# Patient Record
Sex: FEMALE | Race: WHITE | URBAN - METROPOLITAN AREA
[De-identification: names, ages, dates, MRNs, and addresses within clinical notes are randomized per-mention and may not be internally consistent; named-entity substitution may affect disease eponyms.]

---

## 2017-02-16 ENCOUNTER — OFFICE VISIT (OUTPATIENT)
Dept: INTERNAL MEDICINE CLINIC | Age: 37
End: 2017-02-16

## 2017-02-16 VITALS
OXYGEN SATURATION: 100 % | SYSTOLIC BLOOD PRESSURE: 125 MMHG | WEIGHT: 112 LBS | TEMPERATURE: 98.3 F | BODY MASS INDEX: 19.84 KG/M2 | RESPIRATION RATE: 18 BRPM | DIASTOLIC BLOOD PRESSURE: 88 MMHG | HEIGHT: 63 IN | HEART RATE: 90 BPM

## 2017-02-16 DIAGNOSIS — M62.838 MUSCLE SPASM: ICD-10-CM

## 2017-02-16 DIAGNOSIS — F41.9 ANXIETY: Primary | ICD-10-CM

## 2017-02-16 DIAGNOSIS — M26.629 TMJ SYNDROME: ICD-10-CM

## 2017-02-16 DIAGNOSIS — Z76.0 MEDICATION REFILL: ICD-10-CM

## 2017-02-16 DIAGNOSIS — M54.2 NECK PAIN: ICD-10-CM

## 2017-02-16 PROBLEM — S03.00XA TMJ (DISLOCATION OF TEMPOROMANDIBULAR JOINT): Status: ACTIVE | Noted: 2017-02-16

## 2017-02-16 PROBLEM — F32.A DEPRESSION: Status: ACTIVE | Noted: 2017-02-16

## 2017-02-16 RX ORDER — CYCLOBENZAPRINE HCL 10 MG
TABLET ORAL
COMMUNITY
End: 2017-02-16 | Stop reason: SDUPTHER

## 2017-02-16 RX ORDER — BUPROPION HYDROCHLORIDE 100 MG/1
TABLET ORAL
Refills: 0 | COMMUNITY
Start: 2017-02-07 | End: 2017-02-16 | Stop reason: SDUPTHER

## 2017-02-16 RX ORDER — ALPRAZOLAM 0.25 MG/1
0.25 TABLET ORAL
Qty: 30 TAB | Refills: 5 | Status: SHIPPED | OUTPATIENT
Start: 2017-02-16 | End: 2017-08-22 | Stop reason: SDUPTHER

## 2017-02-16 RX ORDER — BUPROPION HYDROCHLORIDE 100 MG/1
100 TABLET ORAL DAILY
Qty: 30 TAB | Refills: 11 | Status: SHIPPED | OUTPATIENT
Start: 2017-02-16 | End: 2017-04-25 | Stop reason: SDUPTHER

## 2017-02-16 RX ORDER — FLUOXETINE HYDROCHLORIDE 20 MG/1
CAPSULE ORAL
Refills: 0 | COMMUNITY
Start: 2017-02-07 | End: 2017-02-16 | Stop reason: SDUPTHER

## 2017-02-16 RX ORDER — ALPRAZOLAM 0.25 MG/1
0.25 TABLET ORAL
COMMUNITY
End: 2017-02-16 | Stop reason: SDUPTHER

## 2017-02-16 RX ORDER — CYCLOBENZAPRINE HCL 10 MG
10 TABLET ORAL
Qty: 30 TAB | Refills: 5 | Status: SHIPPED | OUTPATIENT
Start: 2017-02-16 | End: 2017-10-12 | Stop reason: SDUPTHER

## 2017-02-16 RX ORDER — FLUOXETINE HYDROCHLORIDE 20 MG/1
20 CAPSULE ORAL DAILY
Qty: 30 CAP | Refills: 11 | Status: SHIPPED | OUTPATIENT
Start: 2017-02-16 | End: 2017-10-24

## 2017-02-16 NOTE — MR AVS SNAPSHOT
Visit Information Date & Time Provider Department Dept. Phone Encounter #  
 2/16/2017  2:00 PM Violetta Eaton [facility] [phone] [id number] Follow-up Instructions Return in about 2 months (around 4/16/2017) for annual CPE. Upcoming Health Maintenance Date Due DTaP/Tdap/Td series (1 - Tdap) 11/11/2000 INFLUENZA AGE 9 TO ADULT 8/1/2016 PAP AKA CERVICAL CYTOLOGY 5/13/2017 Allergies as of 2/16/2017  Review Complete On: 2/16/2017 By: Violetta Eaton MD  
 No Known Allergies Current Immunizations  Never Reviewed No immunizations on file. Not reviewed this visit You Were Diagnosed With   
  
 Codes Comments Anxiety    -  Primary ICD-10-CM: F41.9 ICD-9-CM: 300.00 Muscle spasm     ICD-10-CM: E68.667 ICD-9-CM: 728.85 Neck pain     ICD-10-CM: M54.2 ICD-9-CM: 723.1 TMJ syndrome     ICD-10-CM: G43.505 ICD-9-CM: 524.69 Medication refill     ICD-10-CM: Z76.0 ICD-9-CM: V68.1 Vitals BP Pulse Temp Resp Height(growth percentile) Weight(growth percentile) 125/88 90 98.3 °F (36.8 °C) (Oral) 18 5' 3\" (1.6 m) 112 lb (50.8 kg) LMP SpO2 BMI Smoking Status 02/02/2017 100% 19.84 kg/m2 Never Smoker BMI and BSA Data Body Mass Index Body Surface Area  
 19.84 kg/m 2 1.5 m 2 Preferred Pharmacy Pharm[facility] Name [facility] [place] [facility] [phone] Your Updated Medication List  
  
   
This list is accurate as of: 2/16/17  3:06 PM.  Always use your most recent med list.  
  
  
  
  
 ALPRAZolam 0.25 mg tablet Commonly known as:  Aleksandra Castaneda Take 1 Tab by mouth nightly as needed for Anxiety. Max Daily Amount: 0.25 mg. Indications: ANXIETY, PANIC DISORDER  
  
 buPROPion 100 mg tablet Commonly known as:  [facility] Take 1 Tab by mouth daily. Indications: ANXIETY WITH DEPRESSION  
  
 cyclobenzaprine 10 mg tablet Commonly known as:  FLEXERIL Take 1 Tab by mouth three (3) times daily as needed for Muscle Spasm(s). FLUoxetine 20 mg capsule Commonly known as:  PROzac Take 1 Cap by mouth daily. Indications: major depressive disorder Prescriptions Printed Refills ALPRAZolam (XANAX) 0.25 mg tablet 5 Sig: Take 1 Tab by mouth nightly as needed for Anxiety. Max Daily Amount: 0.25 mg. Indications: ANXIETY, PANIC DISORDER Class: Print Route: Oral  
  
Prescriptions Sent to Pharmacy Refills buPROPion (WELLBUTRIN) 100 mg tablet 11 Sig: Take 1 Tab by mouth daily. Indications: ANXIETY WITH DEPRESSION Class: Normal  
 Pharmacy: [place] [id number] Ph #: [phone] Route: Oral  
 FLUoxetine (PROZAC) 20 mg capsule 11 Sig: Take 1 Cap by mouth daily. Indications: major depressive disorder Class: Normal  
 Pharmacy: [place] [address] Ph #: [phone] Route: Oral  
 cyclobenzaprine (FLEXERIL) 10 mg tablet 5 Sig: Take 1 Tab by mouth three (3) times daily as needed for Muscle Spasm(s). Class: Normal  
 Pharmacy: [place] [address] Ph #: [phone] Route: Oral  
  
Follow-up Instructions Return in about 2 months (around 4/16/2017) for annual CPE. Introducing [facility]! Aliyah Gaines introduces [email or web address] patient portal. Now you can access parts of your medical record, email your doctor's office, and request medication refills online. 1. In your internet browser, go to [email or web address] 2. Click on the First Time User? Click Here link in the Sign In box. You will see the New Member Sign Up page. 3. Enter your [email or web address] Access Code exactly as it appears below. You will not need to use this code after youve completed the sign-up process.  If you do not sign up before the expiration date, you must request a new code. · My Digital Life Access Code: [email or web address] Expires: 5/17/2017  3:06 PM 
 
4. Enter the last four digits of your Social Security Number (xxxx) and Date of Birth (mm/dd/yyyy) as indicated and click Submit. You will be taken to the next sign-up page. 5. Create a My Digital Life ID. This will be your My Digital Life login ID and cannot be changed, so think of one that is secure and easy to remember. 6. Create a My Digital Life password. You can change your password at any time. 7. Enter your Password Reset Question and Answer. This can be used at a later time if you forget your password. 8. Enter your e-mail address. You will receive e-mail notification when new information is available in [address] 9. Click Sign Up. You can now view and download portions of your medical record. 10. Click the Download Summary menu link to download a portable copy of your medical information. If you have questions, please visit the Frequently Asked Questions section of the My Digital Life website. Remember, My Digital Life is NOT to be used for urgent needs. For medical emergencies, dial 911. Now available from your iPhone and Android! Please provide this summary of care documentation to your next provider. Your primary care clinician is listed as [facility]. If you have any questions after today's visit, please call [phone].

## 2017-02-16 NOTE — PROGRESS NOTES
HISTORY OF PRESENT ILLNESS  Trena Carvalho is a 39 y.o. female. Visit Vitals    /88    Pulse 90    Temp 98.3 °F (36.8 °C) (Oral)    Resp 18    Ht 5' 3\" (1.6 m)    Wt 112 lb (50.8 kg)    LMP 02/02/2017    SpO2 100%    BMI 19.84 kg/m2       HPI Comments: dx'd with ADD by Dr. Gladis Michaels. 2008? Has been on meds for years. Including Ritalin. She is now off this. Is on other meds for her anxiety which was being managed by prior PCP      She has a fall in her teens from stage into [facility] pit sustaining facial injuries which required multiple surgeries. She has jaw and TMJ dysfunction and needs a referral to oral surgeon for evaluation. Needs a dental appliance. New Patient   The history is provided by the patient. This is a new problem. Review of Systems   Constitutional: Negative. HENT:        Intermittent right facial and jaw pain. Eyes: Negative. Respiratory: Negative. Cardiovascular: Negative. Gastrointestinal: Negative. Genitourinary: Negative. Musculoskeletal: Negative. Neurological: Negative. Psychiatric/Behavioral: Negative. Physical Exam   Constitutional: She is oriented to person, place, and time. She appears well-developed and well-nourished. No distress. HENT:   Right Ear: Tympanic membrane, external ear and ear canal normal.   Left Ear: Tympanic membrane, external ear and ear canal normal.   Mouth/Throat: Uvula is midline, oropharynx is clear and moist and mucous membranes are normal.   No specific TMJ findings at the moment   Eyes: Conjunctivae and EOM are normal.   Cardiovascular: Normal rate and regular rhythm. Pulmonary/Chest: Effort normal and breath sounds normal.   Musculoskeletal: She exhibits no edema. Neurological: She is alert and oriented to person, place, and time. Skin: Skin is warm and dry. She is not diaphoretic. Psychiatric: She has a normal mood and affect. Nursing note and vitals reviewed.       ASSESSMENT and PLAN ICD-10-CM ICD-9-CM    1. Anxiety F41.9 300.00    2. Muscle spasm M62.838 728.85    3.  Neck pain M54.2 723.1    4. TMJ syndrome M26.629 524.69 REFERRAL TO ORAL MAXILLOFACIAL SURGERY   5. Medication refill Z76.0 V68.1 buPROPion (WELLBUTRIN) 100 mg tablet      FLUoxetine (PROZAC) 20 mg capsule      cyclobenzaprine (FLEXERIL) 10 mg tablet      ALPRAZolam (XANAX) 0.25 mg tablet     Past medical history, surgical history, family history, and social history reviewed with patient    Will refer to oral surgeon to evaluate for dental appliance to treat ongoing TMJ sxs and jaw pain    Refill meds as noted    Will f/u 2-3 months for annual CPE

## 2017-02-16 NOTE — PROGRESS NOTES
ROOM # 5    Pt presents today for new patient visit    Pt preferred language for health care discussion is english. Is someone accompanying this pt? no    Is the patient using any DME equipment during OV? no    Depression Screening completed. Active Dx    Learning Assessment completed. Yes    Abuse Screening completed. Yes    Health Maintenance reviewed and discussed per provider. Yes, first visit all HM discussed with the provider    Advance Directive:  1. Do you have an advance directive in place? Patient Reply: No    2. If not, would you like material regarding how to put one in place? Patient Reply: No    Coordination of Care:  1. Have you been to the ER, urgent care clinic since your last visit? Hospitalized since your last visit? No    2. Have you seen or consulted any other health care providers outside of the [address] [place] [address] since your last visit? Include any pap smears or colon screening.  No

## 2017-04-25 ENCOUNTER — OFFICE VISIT (OUTPATIENT)
Dept: INTERNAL MEDICINE CLINIC | Age: 37
End: 2017-04-25

## 2017-04-25 VITALS
OXYGEN SATURATION: 100 % | WEIGHT: 117 LBS | HEART RATE: 85 BPM | BODY MASS INDEX: 20.73 KG/M2 | HEIGHT: 63 IN | RESPIRATION RATE: 18 BRPM | DIASTOLIC BLOOD PRESSURE: 68 MMHG | SYSTOLIC BLOOD PRESSURE: 109 MMHG | TEMPERATURE: 96.8 F

## 2017-04-25 DIAGNOSIS — N60.19 FIBROCYSTIC BREAST, UNSPECIFIED LATERALITY: ICD-10-CM

## 2017-04-25 DIAGNOSIS — Z13.6 SCREENING FOR CARDIOVASCULAR CONDITION: ICD-10-CM

## 2017-04-25 DIAGNOSIS — Z76.0 MEDICATION REFILL: ICD-10-CM

## 2017-04-25 DIAGNOSIS — Z13.1 SCREENING FOR DIABETES MELLITUS: ICD-10-CM

## 2017-04-25 DIAGNOSIS — Z13.0 SCREENING FOR DEFICIENCY ANEMIA: ICD-10-CM

## 2017-04-25 DIAGNOSIS — Z00.00 ROUTINE GENERAL MEDICAL EXAMINATION AT A HEALTH CARE FACILITY: Primary | ICD-10-CM

## 2017-04-25 DIAGNOSIS — F32.A DEPRESSION, UNSPECIFIED DEPRESSION TYPE: ICD-10-CM

## 2017-04-25 RX ORDER — BUPROPION HYDROCHLORIDE 100 MG/1
200 TABLET ORAL DAILY
Qty: 60 TAB | Refills: 11 | Status: SHIPPED | OUTPATIENT
Start: 2017-04-25 | End: 2017-10-24

## 2017-04-25 NOTE — PROGRESS NOTES
Chief Complaint   Patient presents with    Complete Physical       Pt preferred language for health care discussion is english. Is someone accompanying this pt? no    Is the patient using any DME equipment during OV? no    Depression Screening completed. Active Dx    Learning Assessment completed. Yes    Abuse Screening completed. Yes    Health Maintenance reviewed and discussed per provider. Yes    Pt is due for Flu, Tdap. Please order/place referral if appropriate. Advance Directive:  1. Do you have an advance directive in place? Patient Reply:no    2. If not, would you like material regarding how to put one in place? Patient Reply: No    Coordination of Care:  1. Have you been to the ER, urgent care clinic since your last visit? Hospitalized since your last visit? no    2. Have you seen or consulted any other health care providers outside of the [address] [place] [address] since your last visit? Include any pap smears or colon screening.  no

## 2017-04-25 NOTE — PROGRESS NOTES
HISTORY OF PRESENT ILLNESS  Shanda Bui is a 39 y.o. female. Visit Vitals    /68    Pulse 85    Temp 96.8 °F (36 °C) (Oral)    Resp 18    Ht 5' 3\" (1.6 m)    Wt 117 lb (53.1 kg)    LMP 04/20/2017    SpO2 100%    BMI 20.73 kg/m2       Complete Physical   The history is provided by the patient. This is a new problem. Review of Systems   All other systems reviewed and are negative. Physical Exam   Constitutional: She is oriented to person, place, and time. She appears well-developed and well-nourished. No distress. HENT:   Head: Normocephalic and atraumatic. Right Ear: External ear normal.   Left Ear: External ear normal.   Nose: Nose normal.   Mouth/Throat: Oropharynx is clear and moist. No oropharyngeal exudate. Eyes: Conjunctivae and EOM are normal. Pupils are equal, round, and reactive to light. Right eye exhibits no discharge. Left eye exhibits no discharge. No scleral icterus. Neck: Normal range of motion. Neck supple. No JVD present. No tracheal deviation present. No thyromegaly present. Cardiovascular: Normal rate, regular rhythm and normal heart sounds. Exam reveals no gallop. No murmur heard. Pulmonary/Chest: Effort normal and breath sounds normal. No respiratory distress. She has no wheezes. She has no rales. Abdominal: Soft. Bowel sounds are normal. She exhibits no distension and no mass. There is no tenderness. There is no rebound and no guarding. Genitourinary: No breast swelling, tenderness, discharge or bleeding. Pelvic exam was performed with patient supine. Musculoskeletal: She exhibits no edema or tenderness. Lymphadenopathy:     She has no cervical adenopathy. Neurological: She is alert and oriented to person, place, and time. She has normal strength and normal reflexes. She is not disoriented. No cranial nerve deficit or sensory deficit. She exhibits normal muscle tone. She displays a negative Romberg sign.  Coordination and gait normal. Skin: Skin is warm and dry. No rash noted. She is not diaphoretic. No erythema. Psychiatric: She has a normal mood and affect. Her speech is normal and behavior is normal. Judgment and thought content normal.   Nursing note and vitals reviewed. ASSESSMENT and PLAN    ICD-10-CM ICD-9-CM    1. Routine general medical examination at a health care facility Z00.00 V70.0 CBC WITH AUTOMATED DIFF      METABOLIC PANEL, COMPREHENSIVE      LIPID PANEL      URINALYSIS W/ RFLX MICROSCOPIC   2. Fibrocystic breast, unspecified laterality N60.19 610.1    3. Depression, unspecified depression type F32.9 311 TSH AND FREE T4   4. Screening for cardiovascular condition Z13.6 V81.2 LIPID PANEL   5. Screening for diabetes mellitus U34.8 V25.5 METABOLIC PANEL, COMPREHENSIVE   6. Screening for deficiency anemia Z13.0 V78.1 CBC WITH AUTOMATED DIFF   7.  Medication refill Z76.0 V68.1 buPROPion (WELLBUTRIN) 100 mg tablet       Looks good  But would like to increase her wellbutrin to 200 mg    F/u 6-8 weeks for recheck of med change

## 2017-04-25 NOTE — MR AVS SNAPSHOT
Visit Information Date & Time Provider Department Dept. Phone Encounter #  
 4/25/2017  8:45 AM Pilar Cox, [address] [id number] Follow-up Instructions Return in about 6 weeks (around 6/6/2017) for check on med changes. Upcoming Health Maintenance Date Due  
 PAP AKA CERVICAL CYTOLOGY 5/13/2017 DTaP/Tdap/Td series (2 - Td) 4/25/2027 Allergies as of 4/25/2017  Review Complete On: 4/25/2017 By: Pilar Cox MD  
 No Known Allergies Current Immunizations  Never Reviewed No immunizations on file. Not reviewed this visit You Were Diagnosed With   
  
 Codes Comments Routine general medical examination at a health care facility    -  Primary ICD-10-CM: Z00.00 ICD-9-CM: V70.0 Fibrocystic breast, unspecified laterality     ICD-10-CM: N60.19 ICD-9-CM: 610.1 Depression, unspecified depression type     ICD-10-CM: F32.9 ICD-9-CM: 512 Screening for cardiovascular condition     ICD-10-CM: Z13.6 ICD-9-CM: V81.2 Screening for diabetes mellitus     ICD-10-CM: Z13.1 ICD-9-CM: V77.1 Screening for deficiency anemia     ICD-10-CM: Z13.0 ICD-9-CM: V78.1 Medication refill     ICD-10-CM: Z76.0 ICD-9-CM: V68.1 Vitals BP Pulse Temp Resp Height(growth percentile) Weight(growth percentile) 109/68 85 96.8 °F (36 °C) (Oral) 18 5' 3\" (1.6 m) 117 lb (53.1 kg) LMP SpO2 BMI OB Status Smoking Status 04/20/2017 100% 20.73 kg/m2 Having regular periods Never Smoker BMI and BSA Data Body Mass Index Body Surface Area 20.73 kg/m 2 1.54 m 2 Preferred Pharmacy Pharmacy Name Phone [facility] [phone] Your Updated Medication List  
  
   
This list is accurate as of: 4/25/17  9:25 AM.  Always use your most recent med list.  
  
  
  
  
 ALPRAZolam 0.25 mg tablet Commonly known as:  Bárbara Gaitan Take 1 Tab by mouth nightly as needed for Anxiety. Max Daily Amount: 0.25 mg. Indications: ANXIETY, PANIC DISORDER  
  
 buPROPion 100 mg tablet Commonly known as:  [facility] Take 2 Tabs by mouth daily. Indications: ANXIETY WITH DEPRESSION  
  
 cyclobenzaprine 10 mg tablet Commonly known as:  FLEXERIL Take 1 Tab by mouth three (3) times daily as needed for Muscle Spasm(s). FLUoxetine 20 mg capsule Commonly known as:  PROzac Take 1 Cap by mouth daily. Indications: major depressive disorder Prescriptions Sent to Pharmacy Refills buPROPion (WELLBUTRIN) 100 mg tablet 11 Sig: Take 2 Tabs by mouth daily. Indications: ANXIETY WITH DEPRESSION Class: Normal  
 Pharmacy: [address] [facility] [address] [place] [address] #: [phone] Route: Oral  
  
Follow-up Instructions Return in about 6 weeks (around 6/6/2017) for check on med changes. To-Do List   
 04/25/2017 Lab:  CBC WITH AUTOMATED DIFF   
  
 04/25/2017 Lab:  LIPID PANEL   
  
 04/25/2017 Lab:  METABOLIC PANEL, COMPREHENSIVE   
  
 04/25/2017 Lab:  TSH AND FREE T4   
  
 04/25/2017 Lab:  URINALYSIS W/ RFLX MICROSCOPIC Introducing hospitals & HEALTH SERVICES! Dear King Alvarez: Thank you for requesting a Mapado account. Our records indicate that you already have an active Mapado account. You can access your account anytime at [email or web address] Did you know that you can access your hospital and ER discharge instructions at any time in Mapado? You can also review all of your test results from your hospital stay or ER visit. Additional Information If you have questions, please visit the Frequently Asked Questions section of the Mapado website at [email or web address] Remember, Mapado is NOT to be used for urgent needs. For medical emergencies, dial 911. Now available from your iPhone and Android! Please provide this summary of care documentation to your next provider. Your primary care clinician is listed as [facility]. If you have any questions after today's visit, please call [phone].

## 2017-04-26 LAB
A-G RATIO,AGRAT: 2.7 RATIO (ref 1.1–2.6)
ABSOLUTE LYMPHOCYTE COUNT, 10803: 1.2 K/UL (ref 1–4.8)
ALBUMIN SERPL-MCNC: 4.6 G/DL (ref 3.5–5)
ALP SERPL-CCNC: 58 U/L (ref 25–115)
ALT SERPL-CCNC: 17 U/L (ref 5–40)
ANION GAP SERPL CALC-SCNC: 17 MMOL/L
AST SERPL W P-5'-P-CCNC: 17 U/L (ref 10–37)
BASOPHILS # BLD: 0 K/UL (ref 0–0.2)
BASOPHILS NFR BLD: 1 % (ref 0–2)
BILIRUB SERPL-MCNC: 0.2 MG/DL (ref 0.2–1.2)
BILIRUB UR QL: NEGATIVE
BUN SERPL-MCNC: 10 MG/DL (ref 6–22)
CALCIUM SERPL-MCNC: 9.2 MG/DL (ref 8.4–10.5)
CHLORIDE SERPL-SCNC: 98 MMOL/L (ref 98–110)
CHOLEST SERPL-MCNC: 225 MG/DL (ref 110–200)
CO2 SERPL-SCNC: 25 MMOL/L (ref 20–32)
CREAT SERPL-MCNC: 0.7 MG/DL (ref 0.5–1.2)
EOSINOPHIL # BLD: 0.2 K/UL (ref 0–0.5)
EOSINOPHIL NFR BLD: 5 % (ref 0–6)
ERYTHROCYTE [DISTWIDTH] IN BLOOD BY AUTOMATED COUNT: 15.4 % (ref 10–16)
GFRAA, 66117: >60
GFRNA, 66118: >60
GLOBULIN,GLOB: 1.7 G/DL (ref 2–4)
GLUCOSE SERPL-MCNC: 88 MG/DL (ref 65–99)
GLUCOSE UR QL: NEGATIVE MG/DL
GRANULOCYTES,GRANS: 53 % (ref 40–75)
HCT VFR BLD AUTO: 43.1 % (ref 35.1–46.5)
HDLC SERPL-MCNC: 62 MG/DL (ref 40–59)
HGB BLD-MCNC: 13 G/DL (ref 11.7–15.5)
HGB UR QL STRIP: NEGATIVE
KETONES UR QL STRIP.AUTO: NEGATIVE MG/DL
LDLC SERPL CALC-MCNC: 145 MG/DL (ref 50–99)
LEUKOCYTE ESTERASE: NEGATIVE
LYMPHOCYTES, LYMLT: 33 % (ref 27–45)
MCH RBC QN AUTO: 26 PG (ref 26–34)
MCHC RBC AUTO-ENTMCNC: 30 G/DL (ref 32–36)
MCV RBC AUTO: 87 FL (ref 80–95)
MONOCYTES # BLD: 0.3 K/UL (ref 0.1–0.9)
MONOCYTES NFR BLD: 8 % (ref 3–9)
NEUTROPHILS # BLD AUTO: 2 K/UL (ref 1.8–7.7)
NITRITE UR QL STRIP.AUTO: NEGATIVE
PH UR STRIP: 7 PH (ref 5–8)
PLATELET # BLD AUTO: 305 K/UL (ref 140–440)
PMV BLD AUTO: 11.3 FL (ref 6–10.8)
POTASSIUM SERPL-SCNC: 4.5 MMOL/L (ref 3.5–5.5)
PROT SERPL-MCNC: 6.3 G/DL (ref 6.4–8.3)
PROT UR QL STRIP: NEGATIVE MG/DL
RBC # BLD AUTO: 4.93 M/UL (ref 3.8–5.2)
SODIUM SERPL-SCNC: 140 MMOL/L (ref 133–145)
SP GR UR: 1 (ref 1–1.03)
T4 FREE SERPL-MCNC: 1.1 NG/DL (ref 0.9–1.8)
TRIGL SERPL-MCNC: 92 MG/DL (ref 40–149)
TSH SERPL DL<=0.005 MIU/L-ACNC: 1.97 MCU/ML (ref 0.27–4.2)
UROBILINOGEN UR STRIP-MCNC: <2 MG/DL
VLDLC SERPL CALC-MCNC: 18 MG/DL (ref 8–30)
WBC # BLD AUTO: 3.8 K/UL (ref 4–11)

## 2017-06-06 ENCOUNTER — OFFICE VISIT (OUTPATIENT)
Dept: INTERNAL MEDICINE CLINIC | Age: 37
End: 2017-06-06

## 2017-06-06 VITALS
RESPIRATION RATE: 16 BRPM | TEMPERATURE: 97.9 F | DIASTOLIC BLOOD PRESSURE: 79 MMHG | WEIGHT: 109.8 LBS | HEIGHT: 63 IN | OXYGEN SATURATION: 100 % | HEART RATE: 79 BPM | BODY MASS INDEX: 19.45 KG/M2 | SYSTOLIC BLOOD PRESSURE: 110 MMHG

## 2017-06-06 DIAGNOSIS — F32.A DEPRESSION, UNSPECIFIED DEPRESSION TYPE: Primary | ICD-10-CM

## 2017-06-06 DIAGNOSIS — Z12.4 SCREENING FOR CERVICAL CANCER: ICD-10-CM

## 2017-06-06 RX ORDER — ZINC GLUCONATE 10 MG
LOZENGE ORAL DAILY
COMMUNITY

## 2017-06-06 RX ORDER — AMINOCAPROIC ACID 500 MG/1
500 TABLET ORAL DAILY
COMMUNITY
End: 2017-11-21 | Stop reason: ALTCHOICE

## 2017-06-06 RX ORDER — LANOLIN ALCOHOL/MO/W.PET/CERES
CREAM (GRAM) TOPICAL
COMMUNITY

## 2017-06-06 NOTE — PROGRESS NOTES
ROOM # 4    Sammy Tuttle presents today for   Chief Complaint   Patient presents with    Medication Evaluation     6 week f/u on wellbutrin increase       Sammy Tuttle preferred language for health care discussion is english/other. Is someone accompanying this pt? no    Is the patient using any DME equipment during OV? no    Depression Screening:  PHQ over the last two weeks 2/16/2017   PHQ Not Done Active Diagnosis of Depression or Bipolar Disorder       Learning Assessment:  Learning Assessment 2/16/2017   PRIMARY LEARNER Patient   HIGHEST LEVEL OF EDUCATION - PRIMARY LEARNER  SOME COLLEGE   PRIMARY LANGUAGE ENGLISH   LEARNER PREFERENCE PRIMARY LISTENING   ANSWERED BY Joby Prom   RELATIONSHIP SELF       Abuse Screening:  No flowsheet data found. Fall Risk  No flowsheet data found. Health Maintenance reviewed and discussed per provider. Yes    Sammy Tuttle is due for pap smear. Please order/place referral if appropriate. Advance Directive:  1. Do you have an advance directive in place? Patient Reply: no    2. If not, would you like material regarding how to put one in place? Patient Reply: no    Coordination of Care:  1. Have you been to the ER, urgent care clinic since your last visit? Hospitalized since your last visit? no    2. Have you seen or consulted any other health care providers outside of the [address] [place] [address] since your last visit? Include any pap smears or colon screening.  Oral surgeon, [facility], psych therapist

## 2017-06-06 NOTE — PROGRESS NOTES
HISTORY OF PRESENT ILLNESS  Mckinley Castillo is a 39 y.o. female. Visit Vitals    /79 (BP 1 Location: Left arm, BP Patient Position: Sitting)    Pulse 79    Temp 97.9 °F (36.6 °C) (Oral)    Resp 16    Ht 5' 3\" (1.6 m)    Wt 109 lb 12.8 oz (49.8 kg)    LMP 2017    SpO2 100%    BMI 19.45 kg/m2       HPI Comments: Here to f/u on med changes. We inc her wellbutrin last visit which was good timing since her older sister unexpectedly  last month. Medication Evaluation   The history is provided by the patient. This is a new problem. Review of Systems   Constitutional: Negative. Psychiatric/Behavioral: Positive for depression. The patient is nervous/anxious. Some situational depression with sister's death. Sleeping is more fragmented. Physical Exam   Constitutional: She appears well-developed and well-nourished. No distress. Skin: Skin is warm and dry. She is not diaphoretic. Psychiatric: She has a normal mood and affect. Cognition and memory are normal. She expresses no homicidal and no suicidal ideation. She expresses no suicidal plans and no homicidal plans. Nursing note and vitals reviewed. ASSESSMENT and PLAN    ICD-10-CM ICD-9-CM    1. Depression, unspecified depression type F32.9 311    2.  Screening for cervical cancer Z12.4 V76.2 REFERRAL TO OBSTETRICS AND GYNECOLOGY      CANCELED: REFERRAL TO GYNECOLOGY       Seems to be doing OK on current dose of wellbutrin and prozac in spite of recent sister's death    Incidental gyn referral    F/u 3 months

## 2017-08-22 DIAGNOSIS — Z76.0 MEDICATION REFILL: ICD-10-CM

## 2017-08-23 RX ORDER — ALPRAZOLAM 0.25 MG/1
TABLET ORAL
Qty: 30 TAB | Refills: 5 | Status: SHIPPED | OUTPATIENT
Start: 2017-08-23 | End: 2018-01-30 | Stop reason: SDUPTHER

## 2017-09-12 ENCOUNTER — OFFICE VISIT (OUTPATIENT)
Dept: INTERNAL MEDICINE CLINIC | Age: 37
End: 2017-09-12

## 2017-09-12 VITALS
SYSTOLIC BLOOD PRESSURE: 118 MMHG | BODY MASS INDEX: 19.81 KG/M2 | WEIGHT: 111.8 LBS | HEART RATE: 82 BPM | DIASTOLIC BLOOD PRESSURE: 78 MMHG | OXYGEN SATURATION: 97 % | TEMPERATURE: 97.7 F | RESPIRATION RATE: 14 BRPM | HEIGHT: 63 IN

## 2017-09-12 DIAGNOSIS — F32.A DEPRESSION, UNSPECIFIED DEPRESSION TYPE: Primary | ICD-10-CM

## 2017-09-12 DIAGNOSIS — G25.81 RLS (RESTLESS LEGS SYNDROME): ICD-10-CM

## 2017-09-12 DIAGNOSIS — Z12.4 PAP SMEAR FOR CERVICAL CANCER SCREENING: ICD-10-CM

## 2017-09-12 RX ORDER — IBUPROFEN 800 MG/1
TABLET ORAL
Refills: 0 | COMMUNITY
Start: 2017-08-15 | End: 2017-11-21 | Stop reason: ALTCHOICE

## 2017-09-12 NOTE — MR AVS SNAPSHOT
Visit Information Date & Time Provider Department Dept. Phone Encounter #  
 9/12/2017  2:00 PM [facility]arly [facility] [id number] Follow-up Instructions Return in about 20 weeks (around 1/30/2018) for recheck meds and depression sxs, RLS. Upcoming Health Maintenance Date Due  
 PAP AKA CERVICAL CYTOLOGY 5/13/2017 INFLUENZA AGE 9 TO ADULT 10/1/2017* DTaP/Tdap/Td series (3 - Td) 4/25/2027 *Topic was postponed. The date shown is not the original due date. Allergies as of 9/12/2017  Review Complete On: 9/12/2017 By: Shell Galloway MD  
 No Known Allergies Current Immunizations  Never Reviewed No immunizations on file. Not reviewed this visit You Were Diagnosed With   
  
 Codes Comments Depression, unspecified depression type    -  Primary ICD-10-CM: F32.9 ICD-9-CM: 080 RLS (restless legs syndrome)     ICD-10-CM: G25.81 ICD-9-CM: 333.94 Pap smear for cervical cancer screening     ICD-10-CM: Z12.4 ICD-9-CM: V76.2 Vitals BP Pulse Temp Resp Height(growth percentile) Weight(growth percentile) 118/78 82 97.7 °F (36.5 °C) (Oral) 14 5' 3\" (1.6 m) 111 lb 12.8 oz (50.7 kg) LMP SpO2 BMI OB Status Smoking Status 08/30/2017 97% 19.8 kg/m2 Having regular periods Never Smoker BMI and BSA Data Body Mass Index Body Surface Area  
 19.8 kg/m 2 1.5 m 2 [facility] [address] [facility] [phone] Your Updated Medication List  
  
   
This list is accurate as of: 9/12/17  2:29 PM.  Always use your most recent med list.  
  
  
  
  
 ALPRAZolam 0.25 mg tablet Commonly known as:  XANAX  
take 1 tablet by mouth NIGHTLY if needed for anxiety  
  
 aminocaproic acid 500 mg tablet Commonly known as:  AMICAR Take 500 mg by mouth daily. buPROPion 100 mg tablet Commonly known as:  [facility] Take 2 Tabs by mouth daily. Indications: ANXIETY WITH DEPRESSION  
  
 cyclobenzaprine 10 mg tablet Commonly known as:  FLEXERIL Take 1 Tab by mouth three (3) times daily as needed for Muscle Spasm(s). FLUoxetine 20 mg capsule Commonly known as:  PROzac Take 1 Cap by mouth daily. Indications: major depressive disorder  
  
 ibuprofen 800 mg tablet Commonly known as:  MOTRIN  
take 1 tablet by mouth every 6 hours if needed for discomfort Iron 325 mg (65 mg iron) tablet Generic drug:  ferrous sulfate Take  by mouth Daily (before breakfast). magnesium 250 mg Tab Take  by mouth daily. VITAMIN C PO Take 1,000 mg by mouth daily. We Performed the Following REFERRAL TO OBSTETRICS AND GYNECOLOGY [REF51 Custom] Comments:  
 Please evaluate patient for cervical cancer screening. Follow-up Instructions Return in about 20 weeks (around 1/30/2018) for recheck meds and depression sxs, RLS. Referral Information Referral ID Referred By Referred To  
  
 [id number] Antionette Granados MD   
   [address] [place] [address] Phone: [phone] Fax: [phone] Visits Status Start Date End Date 1 New Request 9/12/17 9/12/18 If your referral has a status of pending review or denied, additional information will be sent to support the outcome of this decision. Introducing [facility]! Dear Kaleb Roque: Thank you for requesting a Squrl account. Our records indicate that you already have an active Squrl account. You can access your account anytime at [email or web address] Did you know that you can access your hospital and ER discharge instructions at any time in Squrl? You can also review all of your test results from your hospital stay or ER visit. Additional Information If you have questions, please visit the Frequently Asked Questions section of the [email or web address] website at [email or web address] Remember, Classroom IQ is NOT to be used for urgent needs. For medical emergencies, dial 911. Now available from your iPhone and Android! Please provide this summary of care documentation to your next provider. Your primary care clinician is listed as [facility]. If you have any questions after today's visit, please call [phone].

## 2017-09-12 NOTE — PROGRESS NOTES
Chief Complaint   Patient presents with    Medication Evaluation    Depression       Pt preferred language for health care discussion is [place]. Is someone accompanying this pt? no    Is the patient using any DME equipment during OV? no    Depression Screening:  PHQ over the last two weeks 9/12/2017 2/16/2017   PHQ Not Done Active Diagnosis of Depression or Bipolar Disorder Active Diagnosis of Depression or Bipolar Disorder   Little interest or pleasure in doing things Not at all -   Feeling down, depressed or hopeless Several days -   Total Score PHQ 2 1 -       Learning Assessment:  Learning Assessment 2/16/2017   PRIMARY LEARNER Patient   HIGHEST LEVEL OF EDUCATION - PRIMARY LEARNER  SOME COLLEGE   PRIMARY LANGUAGE ENGLISH   LEARNER PREFERENCE PRIMARY LISTENING   ANSWERED BY TLoma Serve   RELATIONSHIP SELF       Abuse Screening:  Abuse Screening Questionnaire 9/12/2017   Do you ever feel afraid of your partner? N   Are you in a relationship with someone who physically or mentally threatens you? N   Is it safe for you to go home? Y         Health Maintenance reviewed and discussed per provider. Yes    Pt is due for Pap (referral placed). Please order/place referral if appropriate. Pt currently taking Antiplatelet therapy? no      Advance Directive:  1. Do you have an advance directive in place? Patient Reply:no    2. If not, would you like material regarding how to put one in place? Patient Reply: no    Coordination of Care:  1. Have you been to the ER, urgent care clinic since your last visit? Hospitalized since your last visit? no    2. Have you seen or consulted any other health care providers outside of the [address] [place] [address] since your last visit? Include any pap smears or colon screening. no    Any and all medication changes made under Dr. Saman Alcala verbal orders.

## 2017-09-12 NOTE — PROGRESS NOTES
HISTORY OF PRESENT ILLNESS  Scott Single is a 39 y.o. female. Visit Vitals    /78    Pulse 82    Temp 97.7 °F (36.5 °C) (Oral)    Resp 14    Ht 5' 3\" (1.6 m)    Wt 111 lb 12.8 oz (50.7 kg)    LMP 08/30/2017    SpO2 97%    BMI 19.8 kg/m2       Medication Evaluation   The history is provided by the patient. This is a new problem. Depression   The history is provided by the patient. This is a chronic problem. The current episode started more than 1 week ago. The problem occurs daily. The symptoms are relieved by medications. Treatments tried: see med list. The treatment provided moderate relief. Review of Systems   Constitutional: Negative. Psychiatric/Behavioral: Positive for depression. Negative for substance abuse. The patient has insomnia (has improved. She takes afternoon naps). The patient is not nervous/anxious. Physical Exam   Constitutional: She is oriented to person, place, and time. She appears well-developed and well-nourished. No distress. Neurological: She is alert and oriented to person, place, and time. Skin: Skin is warm and dry. She is not diaphoretic. Psychiatric: She has a normal mood and affect. Her behavior is normal. Thought content normal. She expresses no homicidal and no suicidal ideation. She expresses no suicidal plans and no homicidal plans. Nursing note and vitals reviewed. ASSESSMENT and PLAN    ICD-10-CM ICD-9-CM    1. Depression, unspecified depression type F32.9 311    2. RLS (restless legs syndrome) G25.81 333.94    3. Pap smear for cervical cancer screening Z12.4 V76.2 REFERRAL TO OBSTETRICS AND GYNECOLOGY     Depression sxs seem to be doing better.     Her RLS has gotten better as she is taking a low dose iron    Will continue same for now    F/u February for recheck

## 2017-10-12 DIAGNOSIS — Z76.0 MEDICATION REFILL: ICD-10-CM

## 2017-10-13 RX ORDER — CYCLOBENZAPRINE HCL 10 MG
TABLET ORAL
Qty: 30 TAB | Refills: 5 | Status: SHIPPED | OUTPATIENT
Start: 2017-10-13 | End: 2018-01-30 | Stop reason: SDUPTHER

## 2017-10-24 ENCOUNTER — OFFICE VISIT (OUTPATIENT)
Dept: INTERNAL MEDICINE CLINIC | Age: 37
End: 2017-10-24

## 2017-10-24 VITALS
BODY MASS INDEX: 19.67 KG/M2 | DIASTOLIC BLOOD PRESSURE: 75 MMHG | HEIGHT: 63 IN | TEMPERATURE: 97.7 F | WEIGHT: 111 LBS | RESPIRATION RATE: 17 BRPM | OXYGEN SATURATION: 98 % | SYSTOLIC BLOOD PRESSURE: 114 MMHG | HEART RATE: 82 BPM

## 2017-10-24 DIAGNOSIS — F32.A DEPRESSION, UNSPECIFIED DEPRESSION TYPE: Primary | ICD-10-CM

## 2017-10-24 DIAGNOSIS — Z76.0 MEDICATION REFILL: ICD-10-CM

## 2017-10-24 RX ORDER — FLUOXETINE HYDROCHLORIDE 40 MG/1
40 CAPSULE ORAL DAILY
Qty: 30 CAP | Refills: 0 | Status: SHIPPED | OUTPATIENT
Start: 2017-10-24 | End: 2017-11-21 | Stop reason: SDUPTHER

## 2017-10-24 RX ORDER — BUPROPION HYDROCHLORIDE 300 MG/1
300 TABLET ORAL DAILY
Qty: 30 TAB | Refills: 0 | Status: SHIPPED | OUTPATIENT
Start: 2017-10-24 | End: 2017-11-21 | Stop reason: SDUPTHER

## 2017-10-24 NOTE — PROGRESS NOTES
HISTORY OF PRESENT ILLNESS  Sakina Kellogg is a 39 y.o. female. HPI Comments: Patient present today for medication review for anxiety and depression. Reports medication had been effective until as of recent with uncle passing away in September. Since, patient reports increased fatigue, lethargy, frequent crying episodes. Currently seeing a counselor who suggest patient benefit from med titration. Medication Evaluation   The history is provided by the patient. This is a new problem. The current episode started more than 1 week ago. Associated symptoms include shortness of breath (with exertion). Pertinent negatives include no chest pain, no abdominal pain and no headaches. The symptoms are aggravated by stress. Depression   The history is provided by the patient. This is a chronic problem. The problem occurs constantly. The problem has been rapidly worsening. Associated symptoms include shortness of breath (with exertion). Pertinent negatives include no chest pain, no abdominal pain and no headaches. The symptoms are aggravated by stress. Nothing relieves the symptoms. Treatments tried: wellbutrin and prozac. The treatment provided mild relief. Review of Systems   Constitutional: Negative for chills and fever. Respiratory: Positive for shortness of breath (with exertion). Negative for cough and wheezing. Cardiovascular: Negative for chest pain and palpitations. Gastrointestinal: Negative for abdominal pain, diarrhea, nausea and vomiting. Neurological: Negative for dizziness and headaches. Psychiatric/Behavioral: Positive for depression. Negative for hallucinations and suicidal ideas. The patient is nervous/anxious and has insomnia. Physical Exam   Constitutional: She is oriented to person, place, and time. No distress. Cardiovascular: Normal rate, regular rhythm and normal heart sounds. Pulmonary/Chest: Effort normal and breath sounds normal. No respiratory distress. Neurological: She is alert and oriented to person, place, and time. Psychiatric: She exhibits a depressed mood. Visit Vitals    /75 (BP 1 Location: Right arm, BP Patient Position: Sitting)    Pulse 82    Temp 97.7 °F (36.5 °C) (Oral)    Resp 17    Ht 5' 3\" (1.6 m)    Wt 111 lb (50.3 kg)    SpO2 98%    BMI 19.66 kg/m2      ASSESSMENT and PLAN    ICD-10-CM ICD-9-CM    1. Depression, unspecified depression type F32.9 311 FLUoxetine (PROZAC) 40 mg capsule      buPROPion XL (WELLBUTRIN XL) 300 mg XL tablet   2. Medication refill Z76.0 V68.1      Reviewed plan with patient including diagnoses, treatment and follow up. Provided AVS with education on above diagnoses. Increase in dose. No further questions/concerns at this time. Pt to follow up as scheduled in 1 month for med eval or sooner if symptoms worsen/fail to improve.

## 2017-10-24 NOTE — PROGRESS NOTES
ROOM # 4    Marcel Holstein presents today for   Chief Complaint   Patient presents with    Medication Evaluation     Requesting adjustment on wellbutrin and prozac        Marcel Holstein preferred language for health care discussion is english/other. Is someone accompanying this pt? no    Is the patient using any DME equipment during OV? no    Depression Screening:  PHQ over the last two weeks 9/12/2017 2/16/2017   PHQ Not Done Active Diagnosis of Depression or Bipolar Disorder Active Diagnosis of Depression or Bipolar Disorder   Little interest or pleasure in doing things Not at all -   Feeling down, depressed or hopeless Several days -   Total Score PHQ 2 1 -       Learning Assessment:  Learning Assessment 2/16/2017   PRIMARY LEARNER Patient   HIGHEST LEVEL OF EDUCATION - PRIMARY LEARNER  SOME COLLEGE   PRIMARY LANGUAGE ENGLISH   LEARNER PREFERENCE PRIMARY LISTENING   ANSWERED BY Paige Celeste   RELATIONSHIP SELF       Abuse Screening:  Abuse Screening Questionnaire 9/12/2017   Do you ever feel afraid of your partner? N   Are you in a relationship with someone who physically or mentally threatens you? N   Is it safe for you to go home? Y       Fall Risk  N/I    Health Maintenance reviewed and discussed per provider. Yes    Marcel Holstein is due for flu injection. Please order/place referral if appropriate. Advance Directive:  1. Do you have an advance directive in place? Patient Reply: no    2. If not, would you like material regarding how to put one in place? Patient Reply: no    Coordination of Care:  1. Have you been to the ER, urgent care clinic since your last visit? Hospitalized since your last visit? no    2. Have you seen or consulted any other health care providers outside of the [address] [place] [address] since your last visit? Include any pap smears or colon screening.  no

## 2017-10-24 NOTE — PATIENT INSTRUCTIONS
Please call if you experience uncomfortable side effects from your medication (nausea, vomiting, diarrhea, insomnia, etc.) Call 911 with any suicidal ideation.

## 2017-10-24 NOTE — MR AVS SNAPSHOT
Visit Information Date & Time Provider Department Dept. Phone Encounter #  
 10/24/2017  3:00 PM [address] [phone] [id number] Follow-up Instructions Return in about 1 month (around 11/24/2017) for F/up, Medication Refill. Your Appointments 1/30/2018  2:00 PM  
Office Visit with MD Dami Ledesma [address] [facility] Appt Note: 20 week f/u recheck meds/depression sxs [address]  
  
   
 [address] [facility] Upcoming Health Maintenance Date Due  
 PAP AKA CERVICAL CYTOLOGY 5/13/2017 INFLUENZA AGE 9 TO ADULT 8/1/2017 DTaP/Tdap/Td series (3 - Td) 4/25/2027 Allergies as of 10/24/2017  Review Complete On: 10/24/2017 By: Valencia Lynn No Known Allergies Current Immunizations  Never Reviewed No immunizations on file. Not reviewed this visit You Were Diagnosed With   
  
 Codes Comments Depression, unspecified depression type    -  Primary ICD-10-CM: F32.9 ICD-9-CM: 673 Medication refill     ICD-10-CM: Z76.0 ICD-9-CM: V68.1 Vitals BP Pulse Temp Resp Height(growth percentile) Weight(growth percentile) 114/75 (BP 1 Location: Right arm, BP Patient Position: Sitting) 82 97.7 °F (36.5 °C) (Oral) 17 5' 3\" (1.6 m) 111 lb (50.3 kg) SpO2 BMI OB Status Smoking Status 98% 19.66 kg/m2 Having regular periods Never Smoker Vitals History BMI and BSA Data Body Mass Index Body Surface Area  
 19.66 kg/m 2 1.5 m 2 Preferred Pharmacy Pharmacy Name Phone [phone] Your Updated Medication List  
  
   
This list is accurate as of: 10/24/17  3:24 PM.  Always use your most recent med list.  
  
  
  
  
 ALPRAZolam 0.25 mg tablet Commonly known as:  XANAX  
take 1 tablet by mouth NIGHTLY if needed for anxiety  
  
 aminocaproic acid 500 mg tablet Commonly known as:  AMICAR Take 500 mg by mouth daily. buPROPion  mg XL tablet Commonly known as:  Johnita Plump Take 1 Tab by mouth daily. Indications: ANXIETY WITH DEPRESSION, major depressive disorder  
  
 cyclobenzaprine 10 mg tablet Commonly known as:  FLEXERIL  
take 1 tablet by mouth three times a day if needed for muscle spasm FLUoxetine 40 mg capsule Commonly known as:  PROzac Take 1 Cap by mouth daily. Indications: major depressive disorder  
  
 ibuprofen 800 mg tablet Commonly known as:  MOTRIN  
take 1 tablet by mouth every 6 hours if needed for discomfort Iron 325 mg (65 mg iron) tablet Generic drug:  ferrous sulfate Take  by mouth Daily (before breakfast). magnesium 250 mg Tab Take  by mouth daily. VITAMIN C PO Take 1,000 mg by mouth daily. Prescriptions Sent to Pharmacy Refills FLUoxetine (PROZAC) 40 mg capsule 0 Sig: Take 1 Cap by mouth daily. Indications: major depressive disorder Class: Normal  
 Pharmacy: [address] #: [phone] Route: Oral  
 buPROPion XL (WELLBUTRIN XL) 300 mg XL tablet 0 Sig: Take 1 Tab by mouth daily. Indications: ANXIETY WITH DEPRESSION, major depressive disorder Class: Normal  
 Pharmacy: [address] #: [phone] Route: Oral  
  
Follow-up Instructions Return in about 1 month (around 11/24/2017) for F/up, Medication Refill. Patient Instructions Please call if you experience uncomfortable side effects from your medication (nausea, vomiting, diarrhea, insomnia, etc.) Call 911 with any suicidal ideation. Introducing [facility] & HEALTH SERVICES! Dear Kathleen Suárez: Thank you for requesting a [employer] account.   Our records indicate that you already have an active [employer] account. You can access your account anytime at [email or web address] Did you know that you can access your hospital and ER discharge instructions at any time in [employer]? You can also review all of your test results from your hospital stay or ER visit. Additional Information If you have questions, please visit the Frequently Asked Questions section of the [employer] website at [email or web address] Remember, [employer] is NOT to be used for urgent needs. For medical emergencies, dial 911. Now available from your iPhone and Android! Please provide this summary of care documentation to your next provider. Your primary care clinician is listed as [facility]. If you have any questions after today's visit, please call [phone].

## 2017-10-30 ENCOUNTER — PATIENT MESSAGE (OUTPATIENT)
Dept: INTERNAL MEDICINE CLINIC | Age: 37
End: 2017-10-30
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
## 2017-11-03 NOTE — TELEPHONE ENCOUNTER
From: Jey Esteban  To: Pawel Torre MD  Sent: 10/30/2017 10:32 AM EDT  Subject: Referral Request    Hi Dr. Vane Yap,  Could you write me a letter to give to [facility] so I may withdraw from my courses? The letter must state that I am your patient and you support an administrative/medical withdrawal from courses. The budsman said no medical records or reasons needed. I really appreciate your help. I have been struggling to keep up with classes because of depression and fatigue. I started off strong but my uncle  2 weeks into Sept and lost my motivation. This year has been difficult since my sister  in May.    If you have any questions, my cell phone [phone]   Thank you,  Jey Esteban

## 2017-11-21 ENCOUNTER — OFFICE VISIT (OUTPATIENT)
Dept: INTERNAL MEDICINE CLINIC | Age: 37
End: 2017-11-21

## 2017-11-21 VITALS
OXYGEN SATURATION: 96 % | SYSTOLIC BLOOD PRESSURE: 122 MMHG | BODY MASS INDEX: 20.06 KG/M2 | WEIGHT: 113.2 LBS | HEIGHT: 63 IN | TEMPERATURE: 98.7 F | DIASTOLIC BLOOD PRESSURE: 77 MMHG | RESPIRATION RATE: 24 BRPM | HEART RATE: 97 BPM

## 2017-11-21 DIAGNOSIS — F32.A DEPRESSION, UNSPECIFIED DEPRESSION TYPE: ICD-10-CM

## 2017-11-21 DIAGNOSIS — Z76.0 MEDICATION REFILL: Primary | ICD-10-CM

## 2017-11-21 DIAGNOSIS — F41.9 ANXIETY: ICD-10-CM

## 2017-11-21 RX ORDER — BISMUTH SUBSALICYLATE 262 MG
1 TABLET,CHEWABLE ORAL DAILY
COMMUNITY

## 2017-11-21 RX ORDER — BUPROPION HYDROCHLORIDE 300 MG/1
300 TABLET ORAL DAILY
Qty: 30 TAB | Refills: 2 | Status: SHIPPED | OUTPATIENT
Start: 2017-11-21 | End: 2018-01-30 | Stop reason: SDUPTHER

## 2017-11-21 RX ORDER — FLUOXETINE HYDROCHLORIDE 40 MG/1
40 CAPSULE ORAL DAILY
Qty: 30 CAP | Refills: 2 | Status: SHIPPED | OUTPATIENT
Start: 2017-11-21 | End: 2018-02-23 | Stop reason: ALTCHOICE

## 2017-11-21 NOTE — MR AVS SNAPSHOT
Visit Information Date & Time Provider Department Dept. Phone Encounter #  
 11/21/2017 11:15 AM [address] [phone] [id number] Follow-up Instructions Return in about 3 months (around 2/21/2018) for F/up. Your Appointments 1/30/2018  2:00 PM  
Office Visit with MD Dami Gerardo [address] [facility]) Appt Note: 20 week f/u recheck meds/depression sxs [address]  
  
   
 [address] [facility] Upcoming Health Maintenance Date Due  
 PAP AKA CERVICAL CYTOLOGY 5/13/2017 Influenza Age 5 to Adult 8/1/2017 DTaP/Tdap/Td series (3 - Td) 4/25/2027 Allergies as of 11/21/2017  Review Complete On: 11/21/2017 By: Zain Rankin LPN No Known Allergies Current Immunizations  Reviewed on 11/20/2017 Name Date Tdap 3/21/2013 Not reviewed this visit You Were Diagnosed With   
  
 Codes Comments Medication refill    -  Primary ICD-10-CM: Z76.0 ICD-9-CM: V68.1 Depression, unspecified depression type     ICD-10-CM: F32.9 ICD-9-CM: 798 Vitals BP Pulse Temp Resp Height(growth percentile) Weight(growth percentile) 122/77 (BP 1 Location: Left arm, BP Patient Position: Sitting) 97 98.7 °F (37.1 °C) (Oral) 24 5' 3\" (1.6 m) 113 lb 3.2 oz (51.3 kg) LMP SpO2 BMI OB Status Smoking Status 10/28/2017 (Approximate) 96% 20.05 kg/m2 Having regular periods Never Smoker Vitals History BMI and BSA Data Body Mass Index Body Surface Area 20.05 kg/m 2 1.51 m 2 Preferred Pharmacy Pharmacy Name Phone [facility] [phone] Your Updated Medication List  
  
   
This list is accurate as of: 11/21/17 11:39 AM.  Always use your most recent med list.  
  
  
  
  
 ALPRAZolam 0.25 mg tablet Commonly known as:  XANAX  
take 1 tablet by mouth NIGHTLY if needed for anxiety buPROPion  mg XL tablet Commonly known as:  Jamir Corpus Take 1 Tab by mouth daily. Indications: ANXIETY WITH DEPRESSION, major depressive disorder  
  
 cyclobenzaprine 10 mg tablet Commonly known as:  FLEXERIL  
take 1 tablet by mouth three times a day if needed for muscle spasm FLUoxetine 40 mg capsule Commonly known as:  PROzac Take 1 Cap by mouth daily. Indications: major depressive disorder Iron 325 mg (65 mg iron) tablet Generic drug:  ferrous sulfate Take  by mouth Daily (before breakfast). magnesium 250 mg Tab Take  by mouth daily. multivitamin tablet Commonly known as:  ONE A DAY Take 1 Tab by mouth daily. Prescriptions Sent to Pharmacy Refills FLUoxetine (PROZAC) 40 mg capsule 2 Sig: Take 1 Cap by mouth daily. Indications: major depressive disorder Class: Normal  
 Pharmacy: [facility] [address] [address] Ph #: [phone] Route: Oral  
 buPROPion XL (WELLBUTRIN XL) 300 mg XL tablet 2 Sig: Take 1 Tab by mouth daily. Indications: ANXIETY WITH DEPRESSION, major depressive disorder Class: Normal  
 Pharmacy: [facility] [address] [address] Ph #: [phone] Route: Oral  
  
Follow-up Instructions Return in about 3 months (around 2/21/2018) for F/up. Introducing [facility] & HEALTH SERVICES! Dear Jamie Street: Thank you for requesting a [employer] account. Our records indicate that you already have an active [employer] account. You can access your account anytime at [email or web address] Did you know that you can access your hospital and ER discharge instructions at any time in [employer]? You can also review all of your test results from your hospital stay or ER visit. Additional Information If you have questions, please visit the Frequently Asked Questions section of the [email or web address] website at [email or web address] Remember, [employer] is NOT to be used for urgent needs. For medical emergencies, dial 911. Now available from your iPhone and Android! Please provide this summary of care documentation to your next provider. Your primary care clinician is listed as [facility]. If you have any questions after today's visit, please call [phone].

## 2017-11-21 NOTE — PROGRESS NOTES
Pt presents today for F/U Depression and Anxiety      Pt preferred language for health care discussion is english. Is someone accompanying this pt? no    Is the patient using any DME equipment during OV? no    Health Maintenance reviewed and discussed per provider. Will F/U with PCP    Advance Directive:  1. Do you have an advance directive in place? Patient Reply: No    2. If not, would you like material regarding how to put one in place? Patient Reply: No    Coordination of Care:  1. Have you been to the ER, urgent care clinic since your last visit? Hospitalized since your last visit? no    2. Have you seen or consulted any other health care providers outside of the [address] [place] [address] since your last visit? Include any pap smears or colon screening.  no

## 2017-11-21 NOTE — PROGRESS NOTES
HISTORY OF PRESENT ILLNESS  Iris Dawkins is a 44 y.o. female. HPI Comments: Patient presents today for f/u medication for depression/anxiety. Pt reports significant improvement since medications increased. Denies worsening depression/anxiety/SI. Would like to continue on current regimen. No other questions or concerns. Medication Evaluation   The history is provided by the patient. This is a new problem. Pertinent negatives include no chest pain, no abdominal pain, no headaches and no shortness of breath. The symptoms are relieved by medications. Depression   The history is provided by the patient. This is a chronic problem. The current episode started more than 1 week ago. The problem occurs daily. The problem has been rapidly improving. Pertinent negatives include no chest pain, no abdominal pain, no headaches and no shortness of breath. The symptoms are aggravated by stress. The symptoms are relieved by medications. Review of Systems   Constitutional: Negative for chills, fever and malaise/fatigue. Respiratory: Negative for shortness of breath. Cardiovascular: Negative for chest pain. Gastrointestinal: Negative for abdominal pain. Neurological: Negative for dizziness and headaches. Psychiatric/Behavioral: Negative for depression and suicidal ideas. The patient is not nervous/anxious. Physical Exam   Constitutional: She appears well-developed and well-nourished. No distress. Cardiovascular: Normal rate, regular rhythm and normal heart sounds. Pulmonary/Chest: Effort normal and breath sounds normal. No respiratory distress. Psychiatric: She has a normal mood and affect. Her speech is normal and behavior is normal. Thought content normal. Her mood appears not anxious. She does not exhibit a depressed mood.      Visit Vitals    /77 (BP 1 Location: Left arm, BP Patient Position: Sitting)    Pulse 97    Temp 98.7 °F (37.1 °C) (Oral)    Resp 24    Ht 5' 3\" (1.6 m)    Wt 113 lb 3.2 oz (51.3 kg)    SpO2 96%    BMI 20.05 kg/m2      ASSESSMENT and PLAN    ICD-10-CM ICD-9-CM    1. Medication refill Z76.0 V68.1 FLUoxetine (PROZAC) 40 mg capsule      buPROPion XL (WELLBUTRIN XL) 300 mg XL tablet   2. Depression, unspecified depression type F32.9 311 FLUoxetine (PROZAC) 40 mg capsule      buPROPion XL (WELLBUTRIN XL) 300 mg XL tablet   3. Anxiety F41.9 300.00 FLUoxetine (PROZAC) 40 mg capsule      buPROPion XL (WELLBUTRIN XL) 300 mg XL tablet     Reviewed plan with patient including diagnoses, treatment and follow up. Provided AVS with education on above diagnoses. No further questions/concerns at this time. Pt to follow up as scheduled or sooner if symptoms worsen/fail to improve.

## 2018-01-30 ENCOUNTER — OFFICE VISIT (OUTPATIENT)
Dept: INTERNAL MEDICINE CLINIC | Age: 38
End: 2018-01-30

## 2018-01-30 VITALS
TEMPERATURE: 99.1 F | RESPIRATION RATE: 14 BRPM | OXYGEN SATURATION: 96 % | DIASTOLIC BLOOD PRESSURE: 80 MMHG | SYSTOLIC BLOOD PRESSURE: 129 MMHG | HEIGHT: 63 IN | WEIGHT: 115 LBS | HEART RATE: 94 BPM | BODY MASS INDEX: 20.38 KG/M2

## 2018-01-30 DIAGNOSIS — Z76.0 MEDICATION REFILL: ICD-10-CM

## 2018-01-30 DIAGNOSIS — F32.A DEPRESSION, UNSPECIFIED DEPRESSION TYPE: Primary | ICD-10-CM

## 2018-01-30 DIAGNOSIS — F32.A DEPRESSION, UNSPECIFIED DEPRESSION TYPE: ICD-10-CM

## 2018-01-30 DIAGNOSIS — F41.9 ANXIETY: ICD-10-CM

## 2018-01-30 RX ORDER — BUPROPION HYDROCHLORIDE 450 MG/1
450 TABLET, FILM COATED, EXTENDED RELEASE ORAL DAILY
Qty: 30 TAB | Refills: 5 | Status: CANCELLED | OUTPATIENT
Start: 2018-01-30

## 2018-01-30 RX ORDER — BUPROPION HYDROCHLORIDE 450 MG/1
450 TABLET, FILM COATED, EXTENDED RELEASE ORAL DAILY
Qty: 30 TAB | Refills: 5 | Status: SHIPPED | OUTPATIENT
Start: 2018-01-30 | End: 2018-02-20 | Stop reason: SDUPTHER

## 2018-01-30 NOTE — PROGRESS NOTES
ROOM # 4    Beverly Robison presents today for   Chief Complaint   Patient presents with    Medication Evaluation       Beverly Robison preferred language for health care discussion is english/other. Is someone accompanying this pt? no    Is the patient using any DME equipment during OV? no    Depression Screening:  PHQ over the last two weeks 9/12/2017 2/16/2017   PHQ Not Done Active Diagnosis of Depression or Bipolar Disorder Active Diagnosis of Depression or Bipolar Disorder   Little interest or pleasure in doing things Not at all -   Feeling down, depressed or hopeless Several days -   Total Score PHQ 2 1 -       Learning Assessment:  Learning Assessment 2/16/2017   PRIMARY LEARNER Patient   HIGHEST LEVEL OF EDUCATION - PRIMARY LEARNER  SOME COLLEGE   PRIMARY LANGUAGE ENGLISH   LEARNER PREFERENCE PRIMARY LISTENING   ANSWERED BY [employer] Distance   RELATIONSHIP SELF       Abuse Screening:  Abuse Screening Questionnaire 9/12/2017   Do you ever feel afraid of your partner? N   Are you in a relationship with someone who physically or mentally threatens you? N   Is it safe for you to go home? Y       Fall Risk  No flowsheet data found. Health Maintenance reviewed and discussed per provider. Yes    Beverly Robison is due for pap. Please order/place referral if appropriate. Advance Directive:  1. Do you have an advance directive in place? Patient Reply: no    2. If not, would you like material regarding how to put one in place? Patient Reply: no    Coordination of Care:  1. Have you been to the ER, urgent care clinic since your last visit? Hospitalized since your last visit? no    2. Have you seen or consulted any other health care providers outside of the [address] [place] [address] since your last visit? Include any pap smears or colon screening.  no

## 2018-01-30 NOTE — PROGRESS NOTES
HISTORY OF PRESENT ILLNESS  Mabel Garcia is a 44 y.o. female. Visit Vitals    /80    Pulse 94    Temp 99.1 °F (37.3 °C) (Oral)    Resp 14    Ht 5' 3\" (1.6 m)    Wt 115 lb (52.2 kg)    LMP 01/12/2018 (Exact Date)    SpO2 96%    BMI 20.37 kg/m2         HPI Comments: Has had a couple of set backs recently and feels her meds are not working as well as they should    She is on wellbutrin xl 300 mg and prozac 40  Mg daily. Also took some iron for RLS and finds she feels much better overall including her energy and her RLS    Medication Evaluation   The history is provided by the patient. This is a new problem. Depression   The history is provided by the patient (see comments). This is a chronic problem. The current episode started more than 1 week ago. The problem occurs daily. Review of Systems   Psychiatric/Behavioral: Positive for depression. Physical Exam   Constitutional: She is oriented to person, place, and time. She appears well-developed and well-nourished. No distress. Cardiovascular: Normal rate. Regular pulse   Pulmonary/Chest: Effort normal.   Neurological: She is alert and oriented to person, place, and time. No tremor   Skin: Skin is warm and dry. She is not diaphoretic. Psychiatric: She expresses no homicidal and no suicidal ideation. Feels more down but not despondent. Just wants to feel better. Nursing note and vitals reviewed. ASSESSMENT and PLAN    ICD-10-CM ICD-9-CM    1. Depression, unspecified depression type F32.9 311 buPROPion  mg Tb24   2. Medication refill Z76.0 V68.1 buPROPion  mg Tb24   3. Anxiety F41.9 300.00 buPROPion  mg Tb24       Looks well.  Mood good today    Discussed sxs of serotonin overload as we adjust meds    Will inc the wellbutrin to max 450 mg    F/u 5-6 weeks

## 2018-01-30 NOTE — MR AVS SNAPSHOT
[facility] 
 
 
 [address] [facility] [address] 
[phone] Patient: Samira Ruff MRN: [id number] :1980 Visit Information Date & Time Provider Department Dept. Phone Encounter #  
 2018  2:00 PM Anabela Wallace, [address] [id number] Follow-up Instructions Return in about 6 weeks (around 3/13/2018) for depression, check on med changes. Upcoming Health Maintenance Date Due  
 PAP AKA CERVICAL CYTOLOGY 2017 DTaP/Tdap/Td series (3 - Td) 2027 Allergies as of 2018  Review Complete On: 2018 By: Anabela Wallace MD  
 No Known Allergies Current Immunizations  Reviewed on 2018 Name Date Tdap 3/21/2013 Reviewed by Yusef Phillips PHARMD on 2018 at 10:27 AM  
You Were Diagnosed With   
  
 Codes Comments Depression, unspecified depression type    -  Primary ICD-10-CM: F32.9 ICD-9-CM: 659 Medication refill     ICD-10-CM: Z76.0 ICD-9-CM: V68.1 Anxiety     ICD-10-CM: F41.9 ICD-9-CM: 300.00 Vitals BP Pulse Temp Resp Height(growth percentile) Weight(growth percentile) 129/80 94 99.1 °F (37.3 °C) (Oral) 14 5' 3\" (1.6 m) 115 lb (52.2 kg) LMP SpO2 BMI OB Status Smoking Status 2018 (Exact Date) 96% 20.37 kg/m2 Having regular periods Never Smoker Vitals History BMI and BSA Data Body Mass Index Body Surface Area  
 20.37 kg/m 2 1.52 m 2 Preferred Pharmacy Pharmacy Name Phone [facility] [place] [id number] [phone] Your Updated Medication List  
  
   
This list is accurate as of: 18  2:33 PM.  Always use your most recent med list.  
  
  
  
  
 ALPRAZolam 0.25 mg tablet Commonly known as:  XANAX  
take 1 tablet by mouth NIGHTLY if needed for anxiety buPROPion HCl 450 mg Tb24 Take 450 mg by mouth daily. Indications: ANXIETY WITH DEPRESSION, major depressive disorder  
  
 cyclobenzaprine 10 mg tablet Commonly known as:  FLEXERIL  
take 1 tablet by mouth three times a day if needed for muscle spasm FLUoxetine 40 mg capsule Commonly known as:  PROzac Take 1 Cap by mouth daily. Indications: major depressive disorder Iron 325 mg (65 mg iron) tablet Generic drug:  ferrous sulfate Take  by mouth Daily (before breakfast). magnesium 250 mg Tab Take  by mouth daily. multivitamin tablet Commonly known as:  ONE A DAY Take 1 Tab by mouth daily. Prescriptions Sent to Pharmacy Refills buPROPion  mg Tb24 5 Sig: Take 450 mg by mouth daily. Indications: ANXIETY WITH DEPRESSION, major depressive disorder Class: Normal  
 Pharmacy: [address] Cris Moser [address] [place] [address] #: [phone] Route: Oral  
  
Follow-up Instructions Return in about 6 weeks (around 3/13/2018) for depression, check on med changes. Introducing [facility] & HEALTH SERVICES! Dear Nereyda Rose: Thank you for requesting a Tiantian. com account. Our records indicate that you already have an active Tiantian. com account. You can access your account anytime at [email or web address] Did you know that you can access your hospital and ER discharge instructions at any time in Tiantian. com? You can also review all of your test results from your hospital stay or ER visit. Additional Information If you have questions, please visit the Frequently Asked Questions section of the Tiantian. com website at [email or web address] Remember, Tiantian. com is NOT to be used for urgent needs. For medical emergencies, dial 911. Now available from your iPhone and Android! Please provide this summary of care documentation to your next provider. Your primary care clinician is listed as [facility].  If you have any questions after today's visit, please call [phone].

## 2018-01-31 RX ORDER — ALPRAZOLAM 0.25 MG/1
0.25 TABLET ORAL
Qty: 30 TAB | Refills: 5 | Status: SHIPPED | OUTPATIENT
Start: 2018-01-31 | End: 2018-08-04 | Stop reason: SDUPTHER

## 2018-01-31 RX ORDER — CYCLOBENZAPRINE HCL 10 MG
10 TABLET ORAL
Qty: 30 TAB | Refills: 5 | Status: SHIPPED | OUTPATIENT
Start: 2018-01-31 | End: 2019-02-05 | Stop reason: SDUPTHER

## 2018-01-31 NOTE — TELEPHONE ENCOUNTER
From: Nicolás Francisco  To: Palomo Valentino MD  Sent: 1/30/2018 10:00 PM EST  Subject: Medication Renewal Request    Original authorizing provider:  MD Nicolás Cates would like a refill of the following medications:  ALPRAZolam (XANAX) 0.25 mg tablet Palomo Valentino MD]  cyclobenzaprine (FLEXERIL) 10 mg tablet Palomo Valentino MD]  buPROPion  mg Tb24 Palomo Valentino MD]    Preferred pharmacy: [address] [place] [address]    Comment:

## 2018-02-20 DIAGNOSIS — Z76.0 MEDICATION REFILL: ICD-10-CM

## 2018-02-20 DIAGNOSIS — F32.A DEPRESSION, UNSPECIFIED DEPRESSION TYPE: ICD-10-CM

## 2018-02-20 DIAGNOSIS — F41.9 ANXIETY: ICD-10-CM

## 2018-02-20 RX ORDER — BUPROPION HYDROCHLORIDE 450 MG/1
450 TABLET, FILM COATED, EXTENDED RELEASE ORAL DAILY
Qty: 30 TAB | Refills: 5 | Status: SHIPPED | OUTPATIENT
Start: 2018-02-20 | End: 2018-02-23

## 2018-02-20 NOTE — TELEPHONE ENCOUNTER
From: Dian Vallejo  To: Melissa Brewer MD  Sent: 2/20/2018 9:48 AM EST  Subject: Medication Renewal Request    Original authorizing provider:  MD Dian Mcclelland would like a refill of the following medications:  buPROPion  mg Tb24 Melissa Brewer MD]    Preferred pharmacy: [address] [place], [place] [address]vd:

## 2018-02-23 DIAGNOSIS — F32.A DEPRESSION, UNSPECIFIED DEPRESSION TYPE: Primary | ICD-10-CM

## 2018-02-23 RX ORDER — BUPROPION HYDROCHLORIDE 150 MG/1
450 TABLET ORAL
Qty: 90 TAB | Refills: 5 | Status: SHIPPED | OUTPATIENT
Start: 2018-02-23 | End: 2018-10-21 | Stop reason: SDUPTHER

## 2018-03-22 ENCOUNTER — OFFICE VISIT (OUTPATIENT)
Dept: INTERNAL MEDICINE CLINIC | Age: 38
End: 2018-03-22

## 2018-03-22 VITALS
DIASTOLIC BLOOD PRESSURE: 85 MMHG | WEIGHT: 115 LBS | OXYGEN SATURATION: 99 % | HEIGHT: 63 IN | TEMPERATURE: 98.8 F | RESPIRATION RATE: 16 BRPM | BODY MASS INDEX: 20.38 KG/M2 | SYSTOLIC BLOOD PRESSURE: 125 MMHG | HEART RATE: 100 BPM

## 2018-03-22 DIAGNOSIS — F32.A DEPRESSION, UNSPECIFIED DEPRESSION TYPE: Primary | Chronic | ICD-10-CM

## 2018-03-22 RX ORDER — FLUOXETINE HYDROCHLORIDE 40 MG/1
40 CAPSULE ORAL DAILY
Refills: 0 | COMMUNITY
Start: 2018-01-31 | End: 2018-04-24 | Stop reason: SDUPTHER

## 2018-03-22 NOTE — PROGRESS NOTES
HISTORY OF PRESENT ILLNESS  Damaris Rich is a 44 y.o. female. Visit Vitals    /85    Pulse 100    Temp 98.8 °F (37.1 °C) (Oral)    Resp 16    Ht 5' 3\" (1.6 m)    Wt 115 lb (52.2 kg)    SpO2 99%    BMI 20.37 kg/m2       HPI Comments: We increased her wellbutrin last time. She noted 2-3 weeks later she began to feel better. More energy. Feels like going out more. Feels more in control of her mood rather than it controlling her. Sleeping better as well  Feels this current routine is good and wants to stay at it  Takes one aleve PM for sleep as well      Depression   The history is provided by the patient (see comments). This is a chronic problem. The current episode started more than 1 week ago. The problem occurs daily. The problem has been gradually improving. Review of Systems   Psychiatric/Behavioral: Positive for depression. Physical Exam   Constitutional: She is oriented to person, place, and time. She appears well-developed and well-nourished. No distress. Cardiovascular: Normal rate. Pulmonary/Chest: Effort normal.   Neurological: She is alert and oriented to person, place, and time. tremor   Skin: She is not diaphoretic. Psychiatric: She has a normal mood and affect. Her speech is normal and behavior is normal. Judgment and thought content normal. Cognition and memory are normal. She expresses no homicidal and no suicidal ideation. Nursing note and vitals reviewed. ASSESSMENT and PLAN    ICD-10-CM ICD-9-CM    1.  Depression, unspecified depression type F32.9 311        She is feeling much better    Will continue current meds for now    F/u 3 months

## 2018-03-22 NOTE — PROGRESS NOTES
Identified pt with two pt identifiers(name and ). Reviewed record in preparation for visit and have obtained necessary documentation. Chief Complaint   Patient presents with    Depression     1mo f/u    Medication Evaluation     dose increase for wellbutrin; pt denies negative side effects        Health Maintenance Due   Topic    PAP AKA CERVICAL CYTOLOGY        Coordination of Care Questionnaire:  :   1) Have you been to an emergency room, urgent care clinic since your last visit? no   Hospitalized since your last visit? no             2. Have seen or consulted any other health care provider since your last visit? NO  If yes, where when, and reason for visit? Patient is accompanied by self I have received verbal consent from Jovanny Borden to discuss any/all medical information while they are present in the room.

## 2018-03-22 NOTE — MR AVS SNAPSHOT
[address] [facility] 
 
 
 [address] [facility] [address] 
[phone] Patient: Alejandro Velasquez MRN: [id number] :1980 Visit Information Date & Time Provider Department Dept. Phone Encounter #  
 3/22/2018  9:30 AM Ozzie Levin MD [facility] [address] [id number] Follow-up Instructions Return in about 3 months (around 2018) for depression. Upcoming Health Maintenance Date Due  
 PAP AKA CERVICAL CYTOLOGY 2018* DTaP/Tdap/Td series (3 - Td) 2027 *Topic was postponed. The date shown is not the original due date. Allergies as of 3/22/2018  Review Complete On: 3/22/2018 By: Ozzie Levin MD  
 No Known Allergies Current Immunizations  Reviewed on 2018 Name Date Tdap 3/21/2013 Not reviewed this visit You Were Diagnosed With   
  
 Codes Comments Depression, unspecified depression type    -  Primary ICD-10-CM: F32.9 ICD-9-CM: 264 Vitals BP Pulse Temp Resp Height(growth percentile) Weight(growth percentile) 125/85 100 98.8 °F (37.1 °C) (Oral) 16 5' 3\" (1.6 m) 115 lb (52.2 kg) SpO2 BMI OB Status Smoking Status 99% 20.37 kg/m2 Having regular periods Never Smoker Vitals History BMI and BSA Data Body Mass Index Body Surface Area  
 20.37 kg/m 2 1.52 m 2 Preferred Pharmacy Pharmacy Name Phone [address] [id number] [phone] Your Updated Medication List  
  
   
This list is accurate as of 3/22/18  9:59 AM.  Always use your most recent med list.  
  
  
  
  
 ALEVE -25 mg Tab Generic drug:  naproxen-diphenhydramine Take  by mouth. ALPRAZolam 0.25 mg tablet Commonly known as:  Joline Mcburney Take 1 Tab by mouth nightly as needed for Anxiety. Max Daily Amount: 0.25 mg. buPROPion  mg tablet Commonly known as:  Ivette Adams  
 Take 3 Tabs by mouth every morning. Indications: ANXIETY WITH DEPRESSION  
  
 cyclobenzaprine 10 mg tablet Commonly known as:  FLEXERIL Take 1 Tab by mouth three (3) times daily as needed for Muscle Spasm(s). FLUoxetine 40 mg capsule Commonly known as:  PROzac Take 40 mg by mouth daily. Iron 325 mg (65 mg iron) tablet Generic drug:  ferrous sulfate Take  by mouth Daily (before breakfast). magnesium 250 mg Tab Take  by mouth daily. multivitamin tablet Commonly known as:  ONE A DAY Take 1 Tab by mouth daily. Follow-up Instructions Return in about 3 months (around 6/22/2018) for depression. Introducing hospitals & HEALTH SERVICES! Dear Ashutosh Contreras: Thank you for requesting a Sweet Cred account. Our records indicate that you already have an active Sweet Cred account. You can access your account anytime at [email or web address] Did you know that you can access your hospital and ER discharge instructions at any time in Sweet Cred? You can also review all of your test results from your hospital stay or ER visit. Additional Information If you have questions, please visit the Frequently Asked Questions section of the Sweet Cred website at [email or web address] Remember, Sweet Cred is NOT to be used for urgent needs. For medical emergencies, dial 911. Now available from your iPhone and Android! Please provide this summary of care documentation to your next provider. Your primary care clinician is listed as [facility]. If you have any questions after today's visit, please call [phone].

## 2018-04-23 ENCOUNTER — PATIENT MESSAGE (OUTPATIENT)
Dept: INTERNAL MEDICINE CLINIC | Age: 38
End: 2018-04-23

## 2018-04-24 RX ORDER — FLUOXETINE HYDROCHLORIDE 40 MG/1
40 CAPSULE ORAL DAILY
Qty: 30 CAP | Refills: 5 | Status: SHIPPED | OUTPATIENT
Start: 2018-04-24 | End: 2018-09-28 | Stop reason: SDUPTHER

## 2018-04-24 NOTE — TELEPHONE ENCOUNTER
Requested Prescriptions     Pending Prescriptions Disp Refills    FLUoxetine (PROZAC) 40 mg capsule  0     Sig: Take 1 Cap by mouth daily.

## 2018-04-24 NOTE — TELEPHONE ENCOUNTER
From: Shannon Livingston  To: Mariann Gurrola MD  Sent: 4/23/2018 3:57 PM EDT  Subject: Prescription Question    Good day,    I have been waiting for the refill of prozac- fluoxetine 40 mg capsule 1/day - which I had been taking since Sept 2017 along with wellbutrin. I called [facility] but there are no more refills and they will contact your ofc. But still no word. On my chart it says it was discontinued however we never discussed discontinuing it during my last visit. We discussed keeping everything the same. The prozac/wellbutrin regimen was helping me a lot.      Thanks,  Shannon Livingston  [phone]

## 2018-06-22 ENCOUNTER — OFFICE VISIT (OUTPATIENT)
Dept: INTERNAL MEDICINE CLINIC | Age: 38
End: 2018-06-22

## 2018-06-22 VITALS
TEMPERATURE: 97.3 F | RESPIRATION RATE: 16 BRPM | BODY MASS INDEX: 20.2 KG/M2 | SYSTOLIC BLOOD PRESSURE: 120 MMHG | DIASTOLIC BLOOD PRESSURE: 79 MMHG | WEIGHT: 114 LBS | OXYGEN SATURATION: 99 % | HEART RATE: 93 BPM | HEIGHT: 63 IN

## 2018-06-22 DIAGNOSIS — F32.A MILD DEPRESSION: Primary | ICD-10-CM

## 2018-06-22 DIAGNOSIS — R53.83 FATIGUE, UNSPECIFIED TYPE: ICD-10-CM

## 2018-06-22 NOTE — MR AVS SNAPSHOT
[address] [facility] 
 
 
 [address] [facility] 83 [address] 
[phone] Patient: Cheyenne Ferguson MRN: [id number] :1980 Visit Information Date & Time Provider Department Dept. Phone Encounter #  
 2018  9:45 AM Rashaad Richard MD [employer] [phone] [id number] Follow-up Instructions Return in about 14 weeks (around 2018) for recheck meds for depression. Upcoming Health Maintenance Date Due  
 PAP AKA CERVICAL CYTOLOGY 2018* Influenza Age 5 to Adult 2018 DTaP/Tdap/Td series (3 - Td) 2027 *Topic was postponed. The date shown is not the original due date. Allergies as of 2018  Review Complete On: 2018 By: Rashaad Richard MD  
 No Known Allergies Current Immunizations  Reviewed on 2018 Name Date Tdap 3/21/2013 Not reviewed this visit You Were Diagnosed With   
  
 Codes Comments Mild depression ([facility]    -  Primary ICD-10-CM: F32.0 ICD-9-CM: 972 Fatigue, unspecified type     ICD-10-CM: R53.83 ICD-9-CM: 780.79 Vitals BP Pulse Temp Resp Height(growth percentile) Weight(growth percentile) 120/79 93 97.3 °F (36.3 °C) (Oral) 16 5' 3\" (1.6 m) 114 lb (51.7 kg) SpO2 BMI OB Status Smoking Status 99% 20.19 kg/m2 Having regular periods Never Smoker Vitals History BMI and BSA Data Body Mass Index Body Surface Area  
 20.19 kg/m 2 1.52 m 2 Preferred Pharmacy Pharmacy Name Phone [address] [place] [id number] [phone] Your Updated Medication List  
  
   
This list is accurate as of 18 10:34 AM.  Always use your most recent med list.  
  
  
  
  
 ALEVE -25 mg Tab Generic drug:  naproxen-diphenhydramine Take  by mouth. ALPRAZolam 0.25 mg tablet Commonly known as:  Jamey Jimenez Take 1 Tab by mouth nightly as needed for Anxiety. Max Daily Amount: 0.25 mg. buPROPion  mg tablet Commonly known as:  Twan Chan Take 3 Tabs by mouth every morning. Indications: ANXIETY WITH DEPRESSION  
  
 cyclobenzaprine 10 mg tablet Commonly known as:  FLEXERIL Take 1 Tab by mouth three (3) times daily as needed for Muscle Spasm(s). FLUoxetine 40 mg capsule Commonly known as:  PROzac Take 1 Cap by mouth daily. Iron 325 mg (65 mg iron) tablet Generic drug:  ferrous sulfate Take  by mouth Daily (before breakfast). magnesium 250 mg Tab Take  by mouth daily. multivitamin tablet Commonly known as:  ONE A DAY Take 1 Tab by mouth daily. Follow-up Instructions Return in about 14 weeks (around 9/28/2018) for recheck meds for depression. To-Do List   
 06/22/2018 Lab:  CBC WITH AUTOMATED DIFF   
  
 06/22/2018 Lab:  METABOLIC PANEL, COMPREHENSIVE   
  
 06/22/2018 Lab:  TSH AND FREE T4   
  
 06/22/2018 Lab:  VITAMIN B12 & FOLATE Introducing [facility] SERVICES! Dear Picsean: Thank you for requesting a [employer] de Sac account. Our records indicate that you already have an active [employer] Sac account. You can access your account anytime at [email or web address] Did you know that you can access your hospital and ER discharge instructions at any time in Cool de Sac? You can also review all of your test results from your hospital stay or ER visit. Additional Information If you have questions, please visit the Frequently Asked Questions section of the [employer] website at [email or web address] Remember, Cool de Sac is NOT to be used for urgent needs. For medical emergencies, dial 911. Now available from your iPhone and Android! Please provide this summary of care documentation to your next provider. Your primary care clinician is listed as [facility].  If you have any questions after today's visit, please call [phone].

## 2018-06-22 NOTE — PROGRESS NOTES
HISTORY OF PRESENT ILLNESS  Sena Vargas is a 44 y.o. female. Visit Vitals    /79    Pulse 93    Temp 97.3 °F (36.3 °C) (Oral)    Resp 16    Ht 5' 3\" (1.6 m)    Wt 114 lb (51.7 kg)    SpO2 99%    BMI 20.19 kg/m2       HPI Comments: Reports on her current 450 mg wellbutrin and 40 mg prozac she is doing well. \"in a good place. \"  But she feels more fatigued than she would like. Current Outpatient Prescriptions:  FLUoxetine (PROZAC) 40 mg capsule, Take 1 Cap by mouth daily. naproxen-diphenhydramine (ALEVE PM) 220-25 mg tab, Take  by mouth. buPROPion XL (WELLBUTRIN XL) 150 mg tablet, Take 3 Tabs by mouth every morning. Indications: ANXIETY WITH DEPRESSION  multivitamin (ONE A DAY) tablet, Take 1 Tab by mouth daily. magnesium 250 mg tab, Take  by mouth daily. ferrous sulfate (IRON) 325 mg (65 mg iron) tablet, Take  by mouth Daily (before breakfast). ALPRAZolam (XANAX) 0.25 mg tablet, Take 1 Tab by mouth nightly as needed for Anxiety. Max Daily Amount: 0.25 mg.  cyclobenzaprine (FLEXERIL) 10 mg tablet, Take 1 Tab by mouth three (3) times daily as needed for Muscle Spasm(s). No current facility-administered medications for this visit. Depression   The history is provided by the patient. This is a new problem. The current episode started more than 1 week ago. The problem occurs daily. The problem has been gradually improving. Review of Systems   Psychiatric/Behavioral: Positive for depression. Physical Exam   Constitutional: She is oriented to person, place, and time. She appears well-developed and well-nourished. No distress. Cardiovascular: Normal rate and regular rhythm. Pulmonary/Chest: Effort normal and breath sounds normal.   Musculoskeletal: She exhibits no edema. Neurological: She is alert and oriented to person, place, and time. Skin: Skin is warm and dry. She is not diaphoretic. Psychiatric: She has a normal mood and affect.    Nursing note and vitals reviewed. ASSESSMENT and PLAN    ICD-10-CM ICD-9-CM    1. Mild depression ([facility] F32.0 311 TSH AND FREE T4   2. Fatigue, unspecified type B92.11 939.02 METABOLIC PANEL, COMPREHENSIVE      CBC WITH AUTOMATED DIFF      VITAMIN B12 & FOLATE     Given some fatigue, will update lab, including thyroid. She was concerned about cancer as well given family hx--mult myeloma, leukemia, melanoma. Also diabetes runs in the family    Continue other meds.     F/u 3-4 months for recheck

## 2018-06-22 NOTE — PROGRESS NOTES
Identified pt with two pt identifiers(name and ). Reviewed record in preparation for visit and have obtained necessary documentation. Chief Complaint   Patient presents with    Depression     (Rm #6)  3mo f/u         There are no preventive care reminders to display for this patient. Coordination of Care Questionnaire:  :   1) Have you been to an emergency room, urgent care clinic since your last visit? no   Hospitalized since your last visit? no             2. Have seen or consulted any other health care provider since your last visit? NO  If yes, where when, and reason for visit? 3) Do you have an Advanced Directive/ Living Will in place? NO  If yes, do we have a copy on file NO  If no, would you like information NO    Patient is accompanied by self I have received verbal consent from John Mora to discuss any/all medical information while they are present in the room.

## 2018-06-23 LAB
A-G RATIO,AGRAT: 2.2 RATIO (ref 1.1–2.6)
ABSOLUTE LYMPHOCYTE COUNT, 10803: 1.5 K/UL (ref 1–4.8)
ALBUMIN SERPL-MCNC: 4.6 G/DL (ref 3.5–5)
ALP SERPL-CCNC: 63 U/L (ref 25–115)
ALT SERPL-CCNC: 13 U/L (ref 5–40)
ANION GAP SERPL CALC-SCNC: 18 MMOL/L
AST SERPL W P-5'-P-CCNC: 15 U/L (ref 10–37)
BASOPHILS # BLD: 0.1 K/UL (ref 0–0.2)
BASOPHILS NFR BLD: 1 % (ref 0–2)
BILIRUB SERPL-MCNC: 0.2 MG/DL (ref 0.2–1.2)
BUN SERPL-MCNC: 9 MG/DL (ref 6–22)
CALCIUM SERPL-MCNC: 9.4 MG/DL (ref 8.4–10.5)
CHLORIDE SERPL-SCNC: 100 MMOL/L (ref 98–110)
CO2 SERPL-SCNC: 25 MMOL/L (ref 20–32)
CREAT SERPL-MCNC: 0.6 MG/DL (ref 0.5–1.2)
EOSINOPHIL # BLD: 0.2 K/UL (ref 0–0.5)
EOSINOPHIL NFR BLD: 3 % (ref 0–6)
ERYTHROCYTE [DISTWIDTH] IN BLOOD BY AUTOMATED COUNT: 14.1 % (ref 10–15.5)
FOLATE,FOL: 19.55 NG/ML
GFRAA, 66117: >60
GFRNA, 66118: >60
GLOBULIN,GLOB: 2.1 G/DL (ref 2–4)
GLUCOSE SERPL-MCNC: 86 MG/DL (ref 70–99)
GRANULOCYTES,GRANS: 62 % (ref 40–75)
HCT VFR BLD AUTO: 43.6 % (ref 35.1–46.5)
HGB BLD-MCNC: 14.2 G/DL (ref 11.7–15.5)
LYMPHOCYTES, LYMLT: 25 % (ref 20–45)
MCH RBC QN AUTO: 28 PG (ref 26–34)
MCHC RBC AUTO-ENTMCNC: 33 G/DL (ref 31–36)
MCV RBC AUTO: 87 FL (ref 80–95)
MONOCYTES # BLD: 0.5 K/UL (ref 0.1–1)
MONOCYTES NFR BLD: 9 % (ref 3–12)
NEUTROPHILS # BLD AUTO: 3.7 K/UL (ref 1.8–7.7)
PLATELET # BLD AUTO: 268 K/UL (ref 140–440)
PMV BLD AUTO: 11 FL (ref 9–13)
POTASSIUM SERPL-SCNC: 4.5 MMOL/L (ref 3.5–5.5)
PROT SERPL-MCNC: 6.7 G/DL (ref 6.4–8.3)
RBC # BLD AUTO: 5.01 M/UL (ref 3.8–5.2)
SODIUM SERPL-SCNC: 143 MMOL/L (ref 133–145)
T4 FREE SERPL-MCNC: 1.2 NG/DL (ref 0.9–1.8)
TSH SERPL DL<=0.005 MIU/L-ACNC: 1.59 MCU/ML (ref 0.27–4.2)
VIT B12 SERPL-MCNC: 704 PG/ML (ref 211–911)
WBC # BLD AUTO: 6 K/UL (ref 4–11)

## 2018-08-04 DIAGNOSIS — Z76.0 MEDICATION REFILL: ICD-10-CM

## 2018-08-06 RX ORDER — ALPRAZOLAM 0.25 MG/1
TABLET ORAL
Qty: 30 TAB | Refills: 5 | Status: SHIPPED | OUTPATIENT
Start: 2018-08-06 | End: 2018-10-21 | Stop reason: SDUPTHER

## 2018-09-28 ENCOUNTER — OFFICE VISIT (OUTPATIENT)
Dept: INTERNAL MEDICINE CLINIC | Age: 38
End: 2018-09-28

## 2018-09-28 VITALS
OXYGEN SATURATION: 98 % | TEMPERATURE: 96.7 F | HEIGHT: 63 IN | SYSTOLIC BLOOD PRESSURE: 115 MMHG | BODY MASS INDEX: 20.38 KG/M2 | HEART RATE: 82 BPM | RESPIRATION RATE: 18 BRPM | WEIGHT: 115 LBS | DIASTOLIC BLOOD PRESSURE: 73 MMHG

## 2018-09-28 DIAGNOSIS — F32.A MILD DEPRESSION: Primary | ICD-10-CM

## 2018-09-28 DIAGNOSIS — F41.9 ANXIETY: ICD-10-CM

## 2018-09-28 RX ORDER — FLUOXETINE HYDROCHLORIDE 40 MG/1
40 CAPSULE ORAL DAILY
Qty: 30 CAP | Refills: 5 | Status: SHIPPED | OUTPATIENT
Start: 2018-09-28 | End: 2019-05-13 | Stop reason: SDUPTHER

## 2018-09-28 NOTE — MR AVS SNAPSHOT
[address] [facility] 
 
 
 [address] [facility] 83 [address] 
[phone] Patient: Janine Mcelroy MRN: [id number] :1980 Visit Information Date & Time Provider Department Dept. Phone Encounter #  
 2018  9:45 AM Long Barrientos MD [address] [facility] [facility] [phone] [id number] Follow-up Instructions Return in about 15 weeks (around 2019) for depression sxs, med check. Upcoming Health Maintenance Date Due Influenza Age 5 to Adult 2018 PAP AKA CERVICAL CYTOLOGY 2021 DTaP/Tdap/Td series (3 - Td) 2027 Allergies as of 2018  Review Complete On: 2018 By: Long Barrientos MD  
 No Known Allergies Current Immunizations  Reviewed on 2018 Name Date Tdap 3/21/2013 Not reviewed this visit You Were Diagnosed With   
  
 Codes Comments Mild depression ([email or web address] 75.)    -  Primary ICD-10-CM: F32.0 ICD-9-CM: 725 Anxiety     ICD-10-CM: F41.9 ICD-9-CM: 300.00 Vitals BP Pulse Temp Resp Height(growth percentile) Weight(growth percentile) 115/73 (BP 1 Location: Right arm, BP Patient Position: Sitting) 82 96.7 °F (35.9 °C) (Oral) 18 5' 3\" (1.6 m) 115 lb (52.2 kg) LMP SpO2 BMI OB Status Smoking Status 09/10/2018 (Approximate) 98% 20.37 kg/m2 Having regular periods Never Smoker Vitals History BMI and BSA Data Body Mass Index Body Surface Area  
 20.37 kg/m 2 1.52 m 2 Preferred Pharmacy Pharmacy Name Phone [address] [phone] Your Updated Medication List  
  
   
This list is accurate as of 18 10:16 AM.  Always use your most recent med list.  
  
  
  
  
 ALEVE -25 mg Tab Generic drug:  naproxen-diphenhydramine Take  by mouth. ALPRAZolam 0.25 mg tablet Commonly known as:  XANAX  
take 1 tablet by mouth at bedtime for anxiety buPROPion  mg tablet Commonly known as:  Tonya Failing Take 3 Tabs by mouth every morning. Indications: ANXIETY WITH DEPRESSION  
  
 cyclobenzaprine 10 mg tablet Commonly known as:  FLEXERIL Take 1 Tab by mouth three (3) times daily as needed for Muscle Spasm(s). FLUoxetine 40 mg capsule Commonly known as:  PROzac Take 1 Cap by mouth daily. Iron 325 mg (65 mg iron) tablet Generic drug:  ferrous sulfate Take  by mouth Daily (before breakfast). magnesium 250 mg Tab Take  by mouth daily. multivitamin tablet Commonly known as:  ONE A DAY Take 1 Tab by mouth daily. Prescriptions Sent to Pharmacy Refills FLUoxetine (PROZAC) 40 mg capsule 5 Sig: Take 1 Cap by mouth daily. Class: Normal  
 [facility] [address] [place] [address] Cris Moser [address] [place] [address] #: [phone] Route: Oral  
  
Follow-up Instructions Return in about 15 weeks (around 1/11/2019) for depression sxs, med check. Introducing [facility] & HEALTH SERVICES! Dear Aide Hollis: Thank you for requesting a [employer] account. Our records indicate that you already have an active [employer] account. You can access your account anytime at [email or web address] Did you know that you can access your hospital and ER discharge instructions at any time in [employer]? You can also review all of your test results from your hospital stay or ER visit. Additional Information If you have questions, please visit the Frequently Asked Questions section of the [employer] website at [email or web address] Remember, [employer] is NOT to be used for urgent needs. For medical emergencies, dial 911. Now available from your iPhone and Android! Please provide this summary of care documentation to your next provider. Your primary care clinician is listed as [facility].  If you have any questions after today's visit, please call [phone].

## 2018-09-28 NOTE — PROGRESS NOTES
HISTORY OF PRESENT ILLNESS  Queen Janice is a 44 y.o. female. Visit Vitals    /73 (BP 1 Location: Right arm, BP Patient Position: Sitting)    Pulse 82    Temp 96.7 °F (35.9 °C) (Oral)    Resp 18    Ht 5' 3\" (1.6 m)    Wt 115 lb (52.2 kg)    LMP 09/10/2018 (Approximate)    SpO2 98%    BMI 20.37 kg/m2       HPI Comments: Her to review meds for her depression and stress    Reports doing well today. Feels med continue to work well at current doses        Current Outpatient Prescriptions:  ALPRAZolam (XANAX) 0.25 mg tablet, take 1 tablet by mouth at bedtime for anxiety  FLUoxetine (PROZAC) 40 mg capsule, Take 1 Cap by mouth daily. naproxen-diphenhydramine (ALEVE PM) 220-25 mg tab, Take  by mouth. buPROPion XL (WELLBUTRIN XL) 150 mg tablet, Take 3 Tabs by mouth every morning. Indications: ANXIETY WITH DEPRESSION  cyclobenzaprine (FLEXERIL) 10 mg tablet, Take 1 Tab by mouth three (3) times daily as needed for Muscle Spasm(s). multivitamin (ONE A DAY) tablet, Take 1 Tab by mouth daily. magnesium 250 mg tab, Take  by mouth daily. ferrous sulfate (IRON) 325 mg (65 mg iron) tablet, Take  by mouth Daily (before breakfast). No current facility-administered medications for this visit. Depression   The history is provided by the patient. The current episode started more than 1 week ago. The problem occurs daily. The problem has been gradually improving. The symptoms are aggravated by stress. The symptoms are relieved by medications. Treatments tried: ,med list.       Review of Systems   Constitutional: Negative. Cardiovascular: Negative. Psychiatric/Behavioral: Positive for depression (stable). Negative for hallucinations, substance abuse and suicidal ideas. The patient is not nervous/anxious. Physical Exam   Constitutional: She is oriented to person, place, and time. She appears well-developed and well-nourished. No distress. Cardiovascular: Normal rate and regular rhythm. Pulmonary/Chest: Effort normal and breath sounds normal.   Musculoskeletal: She exhibits no edema. Neurological: She is alert and oriented to person, place, and time. Skin: Skin is warm and dry. She is not diaphoretic. Psychiatric: She has a normal mood and affect. Her behavior is normal. Judgment and thought content normal. Cognition and memory are normal. She expresses no suicidal ideation. Nursing note and vitals reviewed. ASSESSMENT and PLAN    ICD-10-CM ICD-9-CM    1. Mild depression (HCC) F32.0 311 FLUoxetine (PROZAC) 40 mg capsule   2. Anxiety F41.9 300.00 FLUoxetine (PROZAC) 40 mg capsule       Doing very well overall.     F/u after the holidays, In the spring will discuss stopping of slowing down    F/u 3-4 months

## 2018-09-28 NOTE — PROGRESS NOTES
ROOM # 6    Francis Jaramillo presents today for   Chief Complaint   Patient presents with    Depression    Medication Evaluation       Francis Jaramillo preferred language for health care discussion is english/other. Is someone accompanying this pt? no    Is the patient using any DME equipment during OV? no    Depression Screening:  PHQ over the last two weeks 9/12/2017 2/16/2017   PHQ Not Done Active Diagnosis of Depression or Bipolar Disorder Active Diagnosis of Depression or Bipolar Disorder   Little interest or pleasure in doing things Not at all -   Feeling down, depressed, irritable, or hopeless Several days -   Total Score PHQ 2 1 -       Learning Assessment:  Learning Assessment 2/16/2017   PRIMARY LEARNER Patient   HIGHEST LEVEL OF EDUCATION - PRIMARY LEARNER  SOME COLLEGE   PRIMARY LANGUAGE ENGLISH   LEARNER PREFERENCE PRIMARY LISTENING   ANSWERED BY Shivam Dodge   RELATIONSHIP SELF       Abuse Screening:  Abuse Screening Questionnaire 9/12/2017   Do you ever feel afraid of your partner? N   Are you in a relationship with someone who physically or mentally threatens you? N   Is it safe for you to go home? Y     Health Maintenance reviewed and discussed per provider. Yes    Francis Jaramillo is due for   Health Maintenance Due   Topic Date Due    Influenza Age 5 to Adult  08/01/2018     Please order/place referral if appropriate. Advance Directive:  1. Do you have an advance directive in place? Patient Reply: no    2. If not, would you like material regarding how to put one in place? Patient Reply: no    Coordination of Care:  1. Have you been to the ER, urgent care clinic since your last visit? Hospitalized since your last visit? no    2. Have you seen or consulted any other health care providers outside of the [address] [place] [address] since your last visit? Include any pap smears or colon screening.  no

## 2018-10-22 RX ORDER — BUPROPION HYDROCHLORIDE 150 MG/1
TABLET ORAL
Qty: 90 TAB | Refills: 5 | Status: SHIPPED | OUTPATIENT
Start: 2018-10-22 | End: 2018-10-22 | Stop reason: SDUPTHER

## 2019-02-05 DIAGNOSIS — Z76.0 MEDICATION REFILL: ICD-10-CM

## 2019-02-05 RX ORDER — CYCLOBENZAPRINE HCL 10 MG
TABLET ORAL
Qty: 30 TAB | Refills: 5 | Status: SHIPPED | OUTPATIENT
Start: 2019-02-05 | End: 2019-09-16 | Stop reason: SDUPTHER

## 2019-02-07 NOTE — MR AVS SNAPSHOT
Jaime Davenport is here for her initial visit to [facility]quin  this school year  Consent verified  She is currently in 9th grade at [address]: [employer]  She is getting good grades  Everyone living in her home smokes  Jeffery Bush is unsure if she wants to smoke or not  Educated about the risks of smoking  She calls her grandmother, Dorothea Krishna, "mom"  Dorothea Krishna is who will take Mannyzel to appointments  [facility]  Insurance: [facility]  PCP: Referred - [address]  Dental: Referred  Vision: glasses >1 year  Mental Health: PHQ-9=5; no self harm risk     Student will follow up in 1 month to meet with Provider for [facility] - [facility]  Visit Information Date & Time Provider Department Dept. Phone Encounter #  
 6/6/2017  1:15 PM [address] [phone] [id number] Follow-up Instructions Return in about 3 months (around 9/6/2017) for depression f/u. Upcoming Health Maintenance Date Due  
 PAP AKA CERVICAL CYTOLOGY 5/13/2017 INFLUENZA AGE 9 TO ADULT 8/1/2017 DTaP/Tdap/Td series (3 - Td) 4/25/2027 Allergies as of 6/6/2017  Review Complete On: 6/6/2017 By: Mark Vega MD  
 No Known Allergies Current Immunizations  Never Reviewed No immunizations on file. Not reviewed this visit You Were Diagnosed With   
  
 Codes Comments Depression, unspecified depression type    -  Primary ICD-10-CM: F32.9 ICD-9-CM: 959 Screening for cervical cancer     ICD-10-CM: Z12.4 ICD-9-CM: V76.2 Vitals BP Pulse Temp Resp Height(growth percentile) Weight(growth percentile) 110/79 (BP 1 Location: Left arm, BP Patient Position: Sitting) 79 97.9 °F (36.6 °C) (Oral) 16 5' 3\" (1.6 m) 109 lb 12.8 oz (49.8 kg) LMP SpO2 BMI OB Status Smoking Status 05/27/2017 100% 19.45 kg/m2 Having regular periods Never Smoker Vitals History BMI and BSA Data Body Mass Index Body Surface Area  
 19.45 kg/m 2 1.49 m 2 Preferred Pharmacy Pharmacy Name Phone [address] [place] [id number] [phone] Your Updated Medication List  
  
   
This list is accurate as of: 6/6/17  1:34 PM.  Always use your most recent med list.  
  
  
  
  
 ALPRAZolam 0.25 mg tablet Commonly known as:  Lee Dowell Take 1 Tab by mouth nightly as needed for Anxiety. Max Daily Amount: 0.25 mg. Indications: ANXIETY, PANIC DISORDER  
  
 aminocaproic acid 500 mg tablet Commonly known as:  AMICAR Take 500 mg by mouth daily. buPROPion 100 mg tablet Commonly known as:  [facility]  
 Take 2 Tabs by mouth daily. Indications: ANXIETY WITH DEPRESSION  
  
 cyclobenzaprine 10 mg tablet Commonly known as:  FLEXERIL Take 1 Tab by mouth three (3) times daily as needed for Muscle Spasm(s). FLUoxetine 20 mg capsule Commonly known as:  PROzac Take 1 Cap by mouth daily. Indications: major depressive disorder Iron 325 mg (65 mg iron) tablet Generic drug:  ferrous sulfate Take  by mouth Daily (before breakfast). magnesium 250 mg Tab Take  by mouth daily. VITAMIN C PO Take 1,000 mg by mouth daily. We Performed the Following REFERRAL TO OBSTETRICS AND GYNECOLOGY [REF51 Custom] Follow-up Instructions Return in about 3 months (around 9/6/2017) for depression f/u. Referral Information Referral ID Referred By Referred To [id number] Fina Dee MD   
   [address] [place] [address] Phone: [phone] Fax: [phone] Visits Status Start Date End Date 1 New Request 6/6/17 6/6/18 If your referral has a status of pending review or denied, additional information will be sent to support the outcome of this decision. Introducing [facility]! Dear Rochelle Wong: Thank you for requesting a [employer] account. Our records indicate that you already have an active [employer] account. You can access your account anytime at [email or web address] Did you know that you can access your hospital and ER discharge instructions at any time in [employer]? You can also review all of your test results from your hospital stay or ER visit. Additional Information If you have questions, please visit the Frequently Asked Questions section of the [employer] website at [email or web address] Remember, [employer] is NOT to be used for urgent needs. For medical emergencies, dial 911. Now available from your iPhone and Android! Please provide this summary of care documentation to your next provider. Your primary care clinician is listed as [facility]. If you have any questions after today's visit, please call [phone].

## 2019-02-12 ENCOUNTER — OFFICE VISIT (OUTPATIENT)
Dept: INTERNAL MEDICINE CLINIC | Age: 39
End: 2019-02-12

## 2019-02-12 VITALS
SYSTOLIC BLOOD PRESSURE: 119 MMHG | DIASTOLIC BLOOD PRESSURE: 74 MMHG | HEIGHT: 63 IN | HEART RATE: 87 BPM | BODY MASS INDEX: 20.87 KG/M2 | OXYGEN SATURATION: 99 % | RESPIRATION RATE: 12 BRPM | WEIGHT: 117.8 LBS | TEMPERATURE: 97.2 F

## 2019-02-12 DIAGNOSIS — F41.9 ANXIETY: ICD-10-CM

## 2019-02-12 DIAGNOSIS — F32.A MILD DEPRESSION: Primary | ICD-10-CM

## 2019-02-12 DIAGNOSIS — Z76.0 MEDICATION REFILL: ICD-10-CM

## 2019-02-12 RX ORDER — ALPRAZOLAM 0.25 MG/1
0.25 TABLET ORAL
Qty: 30 TAB | Refills: 5 | Status: SHIPPED | OUTPATIENT
Start: 2019-02-12 | End: 2019-05-13 | Stop reason: SDUPTHER

## 2019-02-12 NOTE — PROGRESS NOTES
Garth Mcfarlane is a 43 y.o. female (: 1980) presenting to address:    Chief Complaint   Patient presents with    Medication Evaluation       Vitals:    19 1707   BP: 119/74   Pulse: 87   Resp: 12   Temp: 97.2 °F (36.2 °C)   TempSrc: Oral   SpO2: 99%   Weight: 117 lb 12.8 oz (53.4 kg)   Height: 5' 3\" (1.6 m)   LMP: 2019       Hearing/Vision:   No exam data present    Learning Assessment:     Learning Assessment 2017   PRIMARY LEARNER Patient   HIGHEST LEVEL OF EDUCATION - PRIMARY LEARNER  SOME COLLEGE   PRIMARY LANGUAGE ENGLISH   LEARNER PREFERENCE PRIMARY LISTENING   ANSWERED BY Yaa Stephenson   RELATIONSHIP SELF     Depression Screening:     3 most recent PHQ Screens 2017   PHQ Not Done Active Diagnosis of Depression or Bipolar Disorder   Little interest or pleasure in doing things Not at all   Feeling down, depressed, irritable, or hopeless Several days   Total Score PHQ 2 1     Fall Risk Assessment:   No flowsheet data found. Abuse Screening:     Abuse Screening Questionnaire 2017   Do you ever feel afraid of your partner? N   Are you in a relationship with someone who physically or mentally threatens you? N   Is it safe for you to go home? Y     Coordination of Care Questionaire:   1. Have you been to the ER, urgent care clinic since your last visit? Hospitalized since your last visit? NO    2. Have you seen or consulted any other health care providers outside of the [address] [place] [address] since your last visit? Include any pap smears or colon screening. NO    Advanced Directive:   1. Do you have an Advanced Directive? NO    2. Would you like information on Advanced Directives? NO    Pt declines flu vaccine today.

## 2019-02-12 NOTE — PROGRESS NOTES
HISTORY OF PRESENT ILLNESS  Mayuri Proctor is a 43 y.o. female. Visit Vitals  /74   Pulse 87   Temp 97.2 °F (36.2 °C) (Oral)   Resp 12   Ht 5' 3\" (1.6 m)   Wt 117 lb 12.8 oz (53.4 kg)   LMP 02/06/2019   SpO2 99%   BMI 20.87 kg/m²       Reports on current meds of welbutrin and prozac she is \"in a good place\"  Has cut back on the xanax        Other         Review of Systems   Constitutional: Negative. Psychiatric/Behavioral: Negative. Physical Exam   Constitutional: She is oriented to person, place, and time. She appears well-developed and well-nourished. No distress. Cardiovascular: Normal rate and regular rhythm. Pulmonary/Chest: Effort normal and breath sounds normal.   Neurological: She is alert and oriented to person, place, and time. She displays no tremor. Skin: Skin is warm and dry. She is not diaphoretic. Psychiatric: She has a normal mood and affect. Her behavior is normal. Judgment and thought content normal.   Nursing note and vitals reviewed. ASSESSMENT and PLAN    ICD-10-CM ICD-9-CM    1. Mild depression ([address] F32.0 311    2. Medication refill Z76.0 V68.1 ALPRAZolam (XANAX) 0.25 mg tablet   3.  Anxiety F41.9 300.00        Doing overall well  Continue same    F/u 3 months for recheck and to discuss discontinuation

## 2019-02-26 ENCOUNTER — TELEPHONE (OUTPATIENT)
Dept: INTERNAL MEDICINE CLINIC | Age: 39
End: 2019-02-26

## 2019-02-27 NOTE — TELEPHONE ENCOUNTER
To further process this request, a review by a clinical [facility] is needed. This review may result in a denial or need for more information. If you would like to discuss this by phone in the event of a potential denial of your request, please contact Prior Author[employer]'s peer to peer line at [phone] to discuss. Per the [employer].

## 2019-02-28 NOTE — TELEPHONE ENCOUNTER
Approved on February 27   PA Case: [id number], Status: Approved, Coverage Starts on: 2/26/2019 12:00:00 AM, Coverage Ends on: 2/27/2020 12:00:00 AM.    [facility] pharm contacted to inform of approval.

## 2019-05-13 ENCOUNTER — OFFICE VISIT (OUTPATIENT)
Dept: INTERNAL MEDICINE CLINIC | Age: 39
End: 2019-05-13

## 2019-05-13 VITALS
WEIGHT: 112.8 LBS | BODY MASS INDEX: 19.99 KG/M2 | SYSTOLIC BLOOD PRESSURE: 97 MMHG | DIASTOLIC BLOOD PRESSURE: 63 MMHG | HEIGHT: 63 IN | HEART RATE: 82 BPM | OXYGEN SATURATION: 99 % | TEMPERATURE: 96.1 F | RESPIRATION RATE: 12 BRPM

## 2019-05-13 DIAGNOSIS — Z76.0 MEDICATION REFILL: ICD-10-CM

## 2019-05-13 DIAGNOSIS — F41.9 ANXIETY: Primary | ICD-10-CM

## 2019-05-13 DIAGNOSIS — F32.A MILD DEPRESSION: ICD-10-CM

## 2019-05-13 RX ORDER — ALPRAZOLAM 0.25 MG/1
0.25 TABLET ORAL
Qty: 30 TAB | Refills: 5 | Status: SHIPPED | OUTPATIENT
Start: 2019-05-13 | End: 2019-09-16 | Stop reason: SDUPTHER

## 2019-05-13 RX ORDER — FLUOXETINE HYDROCHLORIDE 40 MG/1
40 CAPSULE ORAL DAILY
Qty: 30 CAP | Refills: 5 | Status: SHIPPED | OUTPATIENT
Start: 2019-05-13 | End: 2019-06-03 | Stop reason: SDUPTHER

## 2019-05-13 RX ORDER — BUPROPION HYDROCHLORIDE 150 MG/1
450 TABLET ORAL
Qty: 90 TAB | Refills: 5 | Status: SHIPPED | OUTPATIENT
Start: 2019-05-13 | End: 2019-06-03 | Stop reason: SDUPTHER

## 2019-05-13 NOTE — PROGRESS NOTES
HISTORY OF PRESENT ILLNESS  Niurka Faust is a 43 y.o. female. BP 97/63   Pulse 82   Temp 96.1 °F (35.6 °C) (Oral)   Resp 12   Ht 5' 3\" (1.6 m)   Wt 112 lb 12.8 oz (51.2 kg)   LMP 04/22/2019   SpO2 99%   BMI 19.98 kg/m²         Current Outpatient Medications:  ALPRAZolam (XANAX) 0.25 mg tablet, Take 1 Tab by mouth nightly as needed for Anxiety. Max Daily Amount: 0.25 mg.  cyclobenzaprine (FLEXERIL) 10 mg tablet, take 1 tablet by mouth three times a day if needed for muscle spasm  buPROPion XL (WELLBUTRIN XL) 150 mg tablet, Take 3 Tabs by mouth every morning. FLUoxetine (PROZAC) 40 mg capsule, Take 1 Cap by mouth daily. naproxen-diphenhydramine (ALEVE PM) 220-25 mg tab, Take  by mouth.  multivitamin (ONE A DAY) tablet, Take 1 Tab by mouth daily. magnesium 250 mg tab, Take  by mouth daily. ferrous sulfate (IRON) 325 mg (65 mg iron) tablet, Take  by mouth Daily (before breakfast). Here to f/u on her anxiety  She is on wellbutrin, and prozac with intermittent xanax (2-3 times per week)    Feels well on this combiniation        Medication Evaluation   The history is provided by the patient and police. Pertinent negatives include no chest pain and no headaches. Anxiety   The history is provided by the patient. This is a chronic problem. The current episode started more than 1 week ago. The problem occurs daily. Pertinent negatives include no chest pain and no headaches. Review of Systems   Constitutional: Negative. Cardiovascular: Negative for chest pain and palpitations. Neurological: Negative for dizziness and headaches. Psychiatric/Behavioral: Negative. Physical Exam   Constitutional: She is oriented to person, place, and time. She appears well-developed and well-nourished. No distress. Cardiovascular: Normal rate and regular rhythm. Pulmonary/Chest: Effort normal and breath sounds normal.   Musculoskeletal: She exhibits no edema.    Neurological: She is alert and oriented to person, place, and time. Skin: Skin is warm and dry. She is not diaphoretic. Psychiatric: She has a normal mood and affect. Her speech is normal and behavior is normal. Judgment and thought content normal. Cognition and memory are normal.   Nursing note and vitals reviewed. ASSESSMENT and PLAN    ICD-10-CM ICD-9-CM    1. Anxiety F41.9 300.00 FLUoxetine (PROZAC) 40 mg capsule   2. Mild depression (HCC) F32.0 311 FLUoxetine (PROZAC) 40 mg capsule   3. Medication refill Z76.0 V68.1 ALPRAZolam (XANAX) 0.25 mg tablet       Reports doing well today    Will continue current meds. Refills as noted    F/u September for recheck before the winter.  Will need updated lab at that time

## 2019-05-13 NOTE — PROGRESS NOTES
ROOM # 5    Pricila Domínguez presents today for   Chief Complaint   Patient presents with    Medication Evaluation    Anxiety     Pt states symptoms have decreased and \"she is in a good place\"       Pricila Domínguez preferred language for health care discussion is english/other. Is someone accompanying this pt? no    Is the patient using any DME equipment during [address]? no    Depression Screening:  3 most recent PHQ Screens 9/12/2017 2/16/2017   PHQ Not Done Active Diagnosis of Depression or Bipolar Disorder Active Diagnosis of Depression or Bipolar Disorder   Little interest or pleasure in doing things Not at all -   Feeling down, depressed, irritable, or hopeless Several days -   Total Score PHQ 2 1 -       Learning Assessment:  Learning Assessment 2/16/2017   PRIMARY LEARNER Patient   HIGHEST LEVEL OF EDUCATION - PRIMARY LEARNER  SOME COLLEGE   PRIMARY LANGUAGE ENGLISH   LEARNER PREFERENCE PRIMARY LISTENING   ANSWERED BY George Patel   RELATIONSHIP SELF       Abuse Screening:  Abuse Screening Questionnaire 9/12/2017   Do you ever feel afraid of your partner? N   Are you in a relationship with someone who physically or mentally threatens you? N   Is it safe for you to go home? Y       Fall Risk  No flowsheet data found. Health Maintenance reviewed and discussed per provider. Yes    Pricila Domínguez is due for There are no preventive care reminders to display for this patient. Please order/place referral if appropriate. Advance Directive:  1. Do you have an advance directive in place? Patient Reply: no    2. If not, would you like material regarding how to put one in place? Patient Reply: no    Coordination of Care:  1. Have you been to the ER, urgent care clinic since your last visit? Hospitalized since your last visit? no    2. Have you seen or consulted any other health care providers outside of the [address] [place] [address] since your last visit? Include any pap smears or colon screening.  no

## 2019-06-03 DIAGNOSIS — F32.A MILD DEPRESSION: ICD-10-CM

## 2019-06-03 DIAGNOSIS — F41.9 ANXIETY: ICD-10-CM

## 2019-06-03 RX ORDER — FLUOXETINE HYDROCHLORIDE 40 MG/1
40 CAPSULE ORAL DAILY
Qty: 30 CAP | Refills: 5 | Status: SHIPPED | OUTPATIENT
Start: 2019-06-03 | End: 2019-06-04 | Stop reason: SDUPTHER

## 2019-06-03 RX ORDER — BUPROPION HYDROCHLORIDE 150 MG/1
450 TABLET ORAL
Qty: 90 TAB | Refills: 5 | Status: SHIPPED | OUTPATIENT
Start: 2019-06-03 | End: 2019-06-04 | Stop reason: SDUPTHER

## 2019-06-03 NOTE — TELEPHONE ENCOUNTER
Patient request, last OV 5/13/19, next scheduled 9/19/19. Requested Prescriptions     Pending Prescriptions Disp Refills    buPROPion XL (WELLBUTRIN XL) 150 mg tablet 90 Tab 5     Sig: Take 3 Tabs by mouth every morning. Indications: Anxiousness associated with Depression    FLUoxetine (PROZAC) 40 mg capsule 30 Cap 5     Sig: Take 1 Cap by mouth daily.

## 2019-06-04 DIAGNOSIS — F32.A MILD DEPRESSION: ICD-10-CM

## 2019-06-04 DIAGNOSIS — F41.9 ANXIETY: ICD-10-CM

## 2019-06-04 RX ORDER — FLUOXETINE HYDROCHLORIDE 40 MG/1
40 CAPSULE ORAL DAILY
Qty: 90 CAP | Refills: 5 | Status: SHIPPED | OUTPATIENT
Start: 2019-06-04 | End: 2019-12-16 | Stop reason: SDUPTHER

## 2019-06-04 RX ORDER — FLUOXETINE HYDROCHLORIDE 40 MG/1
40 CAPSULE ORAL DAILY
Qty: 30 CAP | Refills: 5 | Status: SHIPPED | OUTPATIENT
Start: 2019-06-04 | End: 2019-06-04 | Stop reason: SDUPTHER

## 2019-06-04 RX ORDER — BUPROPION HYDROCHLORIDE 150 MG/1
450 TABLET ORAL
Qty: 90 TAB | Refills: 5 | Status: SHIPPED | OUTPATIENT
Start: 2019-06-04 | End: 2019-06-04 | Stop reason: SDUPTHER

## 2019-06-04 RX ORDER — BUPROPION HYDROCHLORIDE 150 MG/1
450 TABLET ORAL
Qty: 270 TAB | Refills: 5 | Status: SHIPPED | OUTPATIENT
Start: 2019-06-04 | End: 2019-12-16 | Stop reason: SDUPTHER

## 2019-06-04 NOTE — TELEPHONE ENCOUNTER
Requested Prescriptions     Pending Prescriptions Disp Refills    FLUoxetine (PROZAC) 40 mg capsule 30 Cap 5     Sig: Take 1 Cap by mouth daily.  buPROPion XL (WELLBUTRIN XL) 150 mg tablet 90 Tab 5     Sig: Take 3 Tabs by mouth every morning.  Indications: Anxiousness associated with Depression

## 2019-09-16 ENCOUNTER — OFFICE VISIT (OUTPATIENT)
Dept: INTERNAL MEDICINE CLINIC | Age: 39
End: 2019-09-16

## 2019-09-16 VITALS
HEART RATE: 80 BPM | BODY MASS INDEX: 19.49 KG/M2 | WEIGHT: 110 LBS | DIASTOLIC BLOOD PRESSURE: 72 MMHG | OXYGEN SATURATION: 98 % | HEIGHT: 63 IN | TEMPERATURE: 98.3 F | SYSTOLIC BLOOD PRESSURE: 116 MMHG | RESPIRATION RATE: 20 BRPM

## 2019-09-16 DIAGNOSIS — F41.9 ANXIETY: ICD-10-CM

## 2019-09-16 DIAGNOSIS — Z76.0 MEDICATION REFILL: ICD-10-CM

## 2019-09-16 DIAGNOSIS — F32.A MILD DEPRESSION: Primary | ICD-10-CM

## 2019-09-16 RX ORDER — ALPRAZOLAM 0.25 MG/1
0.25 TABLET ORAL
Qty: 30 TAB | Refills: 5 | Status: SHIPPED | OUTPATIENT
Start: 2019-09-16 | End: 2019-12-16 | Stop reason: SDUPTHER

## 2019-09-16 RX ORDER — CYCLOBENZAPRINE HCL 10 MG
TABLET ORAL
Qty: 30 TAB | Refills: 5 | Status: SHIPPED | OUTPATIENT
Start: 2019-09-16 | End: 2020-03-20 | Stop reason: SDUPTHER

## 2019-09-16 NOTE — PROGRESS NOTES
ROOM # 2201 [facility] presents today for   Chief Complaint   Patient presents with    Anxiety    Depression       Jesús Gil preferred language for health care discussion is english/other. Is someone accompanying this pt? no    Is the patient using any DME equipment during [address]? no    Depression Screening:  3 most recent PHQ Screens 9/12/2017 2/16/2017   PHQ Not Done Active Diagnosis of Depression or Bipolar Disorder Active Diagnosis of Depression or Bipolar Disorder   Little interest or pleasure in doing things Not at all -   Feeling down, depressed, irritable, or hopeless Several days -   Total Score PHQ 2 1 -       Learning Assessment:  Learning Assessment 2/16/2017   PRIMARY LEARNER Patient   HIGHEST LEVEL OF EDUCATION - PRIMARY LEARNER  SOME COLLEGE   PRIMARY LANGUAGE ENGLISH   LEARNER PREFERENCE PRIMARY LISTENING   ANSWERED BY Adal Reddy   RELATIONSHIP SELF       Abuse Screening:  Abuse Screening Questionnaire 9/12/2017   Do you ever feel afraid of your partner? N   Are you in a relationship with someone who physically or mentally threatens you? N   Is it safe for you to go home? Y       Fall Risk  No flowsheet data found. Health Maintenance reviewed and discussed per provider. Yes    Jesús Gil is due for   Health Maintenance Due   Topic Date Due    Influenza Age 5 to Adult  08/01/2019     Please order/place referral if appropriate. Advance Directive:  1. Do you have an advance directive in place? Patient Reply: no    2. If not, would you like material regarding how to put one in place? Patient Reply: no    Coordination of Care:  1. Have you been to the ER, urgent care clinic since your last visit? Hospitalized since your last visit? no    2. Have you seen or consulted any other health care providers outside of the [address] [place] [address] since your last visit? Include any pap smears or colon screening.  no

## 2019-09-16 NOTE — PROGRESS NOTES
HISTORY OF PRESENT ILLNESS  Carey Morgan is a 43 y.o. female. Visit Vitals  /72 (BP 1 Location: Right arm, BP Patient Position: Sitting)   Pulse 80   Temp 98.3 °F (36.8 °C) (Oral)   Resp 20   Ht 5' 3\" (1.6 m)   Wt 110 lb (49.9 kg)   SpO2 98%   BMI 19.49 kg/m²       finally the combination of prozac and wellbutrin has been good  Emotions are more controlled. Feels steadier overall. Would like to think about tapering off    Has had some anxiety at times due to situations. Takes only 1/4 of a zantac      Review of Systems   Constitutional: Negative. Psychiatric/Behavioral: Negative for depression and suicidal ideas. The patient is not nervous/anxious. Physical Exam   Constitutional: She is oriented to person, place, and time. She appears well-developed and well-nourished. No distress. Cardiovascular: Normal rate. Occasional racing heartbeat. But passes quickly. Pulmonary/Chest: Effort normal.   Neurological: She is alert and oriented to person, place, and time. Skin: Skin is warm and dry. She is not diaphoretic. Psychiatric: She has a normal mood and affect. Nursing note and vitals reviewed. ASSESSMENT and PLAN    ICD-10-CM ICD-9-CM    1. Mild depression ([address] F32.0 311    2. Anxiety F41.9 300.00    3. Medication refill Z76.0 V68.1 ALPRAZolam (XANAX) 0.25 mg tablet      cyclobenzaprine (FLEXERIL) 10 mg tablet         Actually doing well today    Discussed possible worsening sxs during the winter    But will agree to beginning a taper oif wellbutrin. Start with one tablet less every other day.  Then can go to 2 tabs daily and hold there 4-6 weeks before attempting further taper    F/u 3 months for med recheck

## 2019-12-16 ENCOUNTER — OFFICE VISIT (OUTPATIENT)
Dept: INTERNAL MEDICINE CLINIC | Age: 39
End: 2019-12-16

## 2019-12-16 VITALS
HEIGHT: 63 IN | SYSTOLIC BLOOD PRESSURE: 115 MMHG | RESPIRATION RATE: 20 BRPM | HEART RATE: 87 BPM | WEIGHT: 113 LBS | OXYGEN SATURATION: 96 % | BODY MASS INDEX: 20.02 KG/M2 | DIASTOLIC BLOOD PRESSURE: 79 MMHG | TEMPERATURE: 97.9 F

## 2019-12-16 DIAGNOSIS — Z76.0 MEDICATION REFILL: ICD-10-CM

## 2019-12-16 DIAGNOSIS — F32.A MILD DEPRESSION: Primary | ICD-10-CM

## 2019-12-16 DIAGNOSIS — F41.9 ANXIETY: ICD-10-CM

## 2019-12-16 RX ORDER — BUPROPION HYDROCHLORIDE 150 MG/1
450 TABLET ORAL
Qty: 270 TAB | Refills: 5 | Status: SHIPPED | OUTPATIENT
Start: 2019-12-16 | End: 2020-06-16 | Stop reason: SDUPTHER

## 2019-12-16 RX ORDER — ALPRAZOLAM 0.25 MG/1
0.25 TABLET ORAL
Qty: 30 TAB | Refills: 5 | Status: SHIPPED | OUTPATIENT
Start: 2019-12-16 | End: 2020-03-20 | Stop reason: SDUPTHER

## 2019-12-16 RX ORDER — FLUOXETINE HYDROCHLORIDE 40 MG/1
40 CAPSULE ORAL DAILY
Qty: 90 CAP | Refills: 5 | Status: SHIPPED | OUTPATIENT
Start: 2019-12-16 | End: 2020-06-16 | Stop reason: SDUPTHER

## 2019-12-16 NOTE — PROGRESS NOTES
ROOM # 2201 [facility] presents today for   Chief Complaint   Patient presents with    Anxiety    Depression       Cameron Covington preferred language for health care discussion is english/other. Is someone accompanying this pt? no    Is the patient using any DME equipment during [address]? no    Depression Screening:  3 most recent PHQ Screens 9/12/2017 2/16/2017   PHQ Not Done Active Diagnosis of Depression or Bipolar Disorder Active Diagnosis of Depression or Bipolar Disorder   Little interest or pleasure in doing things Not at all -   Feeling down, depressed, irritable, or hopeless Several days -   Total Score PHQ 2 1 -       Learning Assessment:  Learning Assessment 2/16/2017   PRIMARY LEARNER Patient   HIGHEST LEVEL OF EDUCATION - PRIMARY LEARNER  SOME COLLEGE   PRIMARY LANGUAGE ENGLISH   LEARNER PREFERENCE PRIMARY LISTENING   ANSWERED BY Oral Colunga   RELATIONSHIP SELF       Abuse Screening:  Abuse Screening Questionnaire 9/12/2017   Do you ever feel afraid of your partner? N   Are you in a relationship with someone who physically or mentally threatens you? N   Is it safe for you to go home? Y       Fall Risk  No flowsheet data found. Health Maintenance reviewed and discussed per provider. Yes    Cameron Covington is due for   Health Maintenance Due   Topic Date Due    Influenza Age 5 to Adult  08/01/2019     Please order/place referral if appropriate. Advance Directive:  1. Do you have an advance directive in place? Patient Reply: no    2. If not, would you like material regarding how to put one in place? Patient Reply: no    Coordination of Care:  1. Have you been to the ER, urgent care clinic since your last visit? Hospitalized since your last visit? no    2. Have you seen or consulted any other health care providers outside of the [address] [place] [address] since your last visit? Include any pap smears or colon screening.  no

## 2019-12-16 NOTE — PROGRESS NOTES
HISTORY OF PRESENT ILLNESS  Dalila Aguirre is a 35 y.o. female. Visit Vitals  /79 (BP 1 Location: Right arm, BP Patient Position: Sitting)   Pulse 87   Temp 97.9 °F (36.6 °C) (Oral)   Resp 20   Ht 5' 3\" (1.6 m)   Wt 113 lb (51.3 kg)   SpO2 96%   BMI 20.02 kg/m²       On xanax, prozac  and wellbutrin    States she feels she is doing well on the current meds    Anxiety   The history is provided by the patient. This is a chronic problem. The problem has not changed since onset. Pertinent negatives include no chest pain and no shortness of breath. The symptoms are aggravated by stress. Depression   The history is provided by the patient. This is a chronic problem. The current episode started more than 1 week ago. The problem occurs daily. The problem has been gradually improving. Pertinent negatives include no chest pain and no shortness of breath. The symptoms are aggravated by stress. Treatments tried: see med list.       Review of Systems   Constitutional: Negative. Respiratory: Negative for shortness of breath. Cardiovascular: Negative for chest pain and palpitations. Psychiatric/Behavioral: Positive for depression. The patient is nervous/anxious. Both are controlled. Physical Exam  Vitals signs and nursing note reviewed. Constitutional:       General: She is not in acute distress. Appearance: She is well-developed. She is not diaphoretic. Cardiovascular:      Rate and Rhythm: Normal rate. Pulmonary:      Effort: Pulmonary effort is normal.   Skin:     General: Skin is warm and dry. Neurological:      Mental Status: She is alert and oriented to person, place, and time. Psychiatric:         Mood and Affect: Mood normal.         Behavior: Behavior normal.         Thought Content: Thought content normal. Thought content does not include homicidal or suicidal ideation.          Judgment: Judgment normal.         ASSESSMENT and PLAN    ICD-10-CM ICD-9-CM    1. [facility] F32.0 311 FLUoxetine (PROZAC) 40 mg capsule      buPROPion XL (WELLBUTRIN XL) 150 mg tablet   2. Anxiety F41.9 300.00 FLUoxetine (PROZAC) 40 mg capsule      buPROPion XL (WELLBUTRIN XL) 150 mg tablet   3.  Medication refill Z76.0 V68.1 ALPRAZolam (XANAX) 0.25 mg tablet      FLUoxetine (PROZAC) 40 mg capsule      buPROPion XL (WELLBUTRIN XL) 150 mg tablet       Doing well on current routine    Refills as noted    F/u 6 months for annual CPE

## 2020-06-16 ENCOUNTER — OFFICE VISIT (OUTPATIENT)
Dept: INTERNAL MEDICINE CLINIC | Age: 40
End: 2020-06-16

## 2020-06-16 VITALS
BODY MASS INDEX: 19.84 KG/M2 | HEART RATE: 84 BPM | DIASTOLIC BLOOD PRESSURE: 89 MMHG | SYSTOLIC BLOOD PRESSURE: 130 MMHG | TEMPERATURE: 97.1 F | WEIGHT: 112 LBS | OXYGEN SATURATION: 99 % | RESPIRATION RATE: 17 BRPM | HEIGHT: 63 IN

## 2020-06-16 DIAGNOSIS — F32.A MILD DEPRESSION: ICD-10-CM

## 2020-06-16 DIAGNOSIS — F41.9 ANXIETY: ICD-10-CM

## 2020-06-16 DIAGNOSIS — Z00.00 ROUTINE GENERAL MEDICAL EXAMINATION AT A HEALTH CARE FACILITY: Primary | ICD-10-CM

## 2020-06-16 DIAGNOSIS — Z13.1 SCREENING FOR DIABETES MELLITUS: ICD-10-CM

## 2020-06-16 DIAGNOSIS — Z76.0 MEDICATION REFILL: ICD-10-CM

## 2020-06-16 DIAGNOSIS — Z13.6 SCREENING FOR CARDIOVASCULAR CONDITION: ICD-10-CM

## 2020-06-16 DIAGNOSIS — Z12.39 SCREENING FOR BREAST CANCER: ICD-10-CM

## 2020-06-16 RX ORDER — FLUOXETINE HYDROCHLORIDE 40 MG/1
40 CAPSULE ORAL DAILY
Qty: 90 CAP | Refills: 5 | Status: SHIPPED | OUTPATIENT
Start: 2020-06-16 | End: 2020-10-06 | Stop reason: SDUPTHER

## 2020-06-16 RX ORDER — BUPROPION HYDROCHLORIDE 150 MG/1
300 TABLET ORAL
Qty: 180 TAB | Refills: 5 | Status: SHIPPED | OUTPATIENT
Start: 2020-06-16 | End: 2020-10-06 | Stop reason: SDUPTHER

## 2020-06-16 RX ORDER — ALPRAZOLAM 0.25 MG/1
0.25 TABLET ORAL
Qty: 30 TAB | Refills: 5 | Status: SHIPPED | OUTPATIENT
Start: 2020-06-16 | End: 2020-08-27 | Stop reason: SDUPTHER

## 2020-06-16 NOTE — PROGRESS NOTES
ROOM # 5  Identified pt with two pt identifiers(name and ). Reviewed record in preparation for visit and have obtained necessary documentation. Chief Complaint   Patient presents with    Physical      Milana Marcano preferred language for health care discussion is english/other. Is the patient using any DME equipment during OV? NO    Milana Marcano is due for: There are no preventive care reminders to display for this patient. Health Maintenance reviewed and discussed per provider  Please order/place referral if appropriate. Advance Directive:  1. Do you have an advance directive in place? Patient Reply: NO    2. If not, would you like material regarding how to put one in place? NO    Coordination of Care:  1. Have you been to the ER, urgent care clinic since your last visit? Hospitalized since your last visit? NO    2. Have you seen or consulted any other health care providers outside of the [address] [place] [address] since your last visit? Include any pap smears or colon screening. NO    Patient is accompanied by self I have received verbal consent from Davidevonda Marcano to discuss any/all medical information while they are present in the room. Learning Assessment:  Learning Assessment 2017   PRIMARY LEARNER Patient   HIGHEST LEVEL OF EDUCATION - PRIMARY LEARNER  SOME COLLEGE   PRIMARY LANGUAGE ENGLISH   LEARNER PREFERENCE PRIMARY LISTENING   ANSWERED BY Sadaf Flood   RELATIONSHIP SELF     Depression Screening:  3 most recent PHQ Screens 2017   PHQ Not Done Active Diagnosis of Depression or Bipolar Disorder Active Diagnosis of Depression or Bipolar Disorder   Little interest or pleasure in doing things Not at all -   Feeling down, depressed, irritable, or hopeless Several days -   Total Score PHQ 2 1 -     Abuse Screening:  Abuse Screening Questionnaire 2017   Do you ever feel afraid of your partner? N   Are you in a relationship with someone who physically or mentally threatens you?  Moise Grimaldo Is it safe for you to go home?  Y     Fall Risk  n/i

## 2020-06-16 NOTE — PROGRESS NOTES
HISTORY OF PRESENT ILLNESS  Abraham Robin is a 35 y.o. female. Visit Vitals  /89 (BP 1 Location: Right arm, BP Patient Position: Sitting)   Pulse 84   Temp 97.1 °F (36.2 °C) (Temporal)   Resp 17   Ht 5' 3\" (1.6 m)   Wt 112 lb (50.8 kg)   SpO2 99%   BMI 19.84 kg/m²       HPI    Review of Systems   Constitutional: Negative for chills and fever. Respiratory: Negative for cough and shortness of breath. Cardiovascular: Negative for chest pain and palpitations. Neurological: Negative for dizziness and headaches. Psychiatric/Behavioral:        Mild anxiety and mild depression are doing well today       Physical Exam  Vitals signs and nursing note reviewed. Constitutional:       General: She is not in acute distress. Appearance: She is well-developed. She is not diaphoretic. HENT:      Head: Normocephalic and atraumatic. Right Ear: Tympanic membrane, ear canal and external ear normal.      Left Ear: Tympanic membrane, ear canal and external ear normal.      Nose: Nose normal.      Mouth/Throat:      Mouth: Mucous membranes are moist.      Pharynx: No oropharyngeal exudate. Eyes:      General: No scleral icterus. Right eye: No discharge. Left eye: No discharge. Conjunctiva/sclera: Conjunctivae normal.      Pupils: Pupils are equal, round, and reactive to light. Neck:      Musculoskeletal: Normal range of motion and neck supple. Thyroid: No thyromegaly. Vascular: No JVD. Trachea: No tracheal deviation. Cardiovascular:      Rate and Rhythm: Normal rate and regular rhythm. Heart sounds: Normal heart sounds. No murmur. No gallop. Pulmonary:      Effort: Pulmonary effort is normal. No respiratory distress. Breath sounds: Normal breath sounds. No wheezing or rales. Abdominal:      General: Bowel sounds are normal. There is no distension. Palpations: Abdomen is soft. There is no mass. Tenderness: There is no abdominal tenderness.  There is no guarding or rebound. Genitourinary:     Exam position: Supine. Musculoskeletal:         General: No tenderness. Lymphadenopathy:      Cervical: No cervical adenopathy. Skin:     General: Skin is warm and dry. Findings: No erythema or rash. Neurological:      Mental Status: She is alert and oriented to person, place, and time. She is not disoriented. Cranial Nerves: No cranial nerve deficit. Sensory: No sensory deficit. Motor: No abnormal muscle tone. Coordination: Coordination normal.      Gait: Gait normal.      Deep Tendon Reflexes: Reflexes are normal and symmetric. Reflexes normal.      Comments:          Psychiatric:         Speech: Speech normal.         Behavior: Behavior normal.         Thought Content: Thought content normal.         Judgment: Judgment normal.         ASSESSMENT and PLAN    ICD-10-CM ICD-9-CM    1. Routine general medical examination at a [facility] care facility A56.24 R95.8 METABOLIC PANEL, COMPREHENSIVE      LIPID PANEL      URINALYSIS W/ RFLX MICROSCOPIC      CBC WITH AUTOMATED DIFF      TSH AND FREE T4   2. Mild depression (HCC) F32.0 311 buPROPion XL (WELLBUTRIN XL) 150 mg tablet      FLUoxetine (PROzac) 40 mg capsule   3. Anxiety F41.9 300.00 buPROPion XL (WELLBUTRIN XL) 150 mg tablet      FLUoxetine (PROzac) 40 mg capsule      TSH AND FREE T4   4. Screening for diabetes mellitus D50.8 Z34.9 METABOLIC PANEL, COMPREHENSIVE   5. Screening for cardiovascular condition Z13.6 V81.2 LIPID PANEL   6. Screening for breast cancer Z12.39 V76.10 [facility] MAMMO BI SCREENING INCL CAD   7. Medication refill Z76.0 V68.1 ALPRAZolam (XANAX) 0.25 mg tablet      buPROPion XL (WELLBUTRIN XL) 150 mg tablet      FLUoxetine (PROzac) 40 mg capsule       Looks well. Discussed anxiety and depression. She is doing well on current meds.  As I don't need to see her soon, asked her to make another appt if she feels she is getting worse    Update lab today    Baseline mammogram ordered    Discussed COVID precautions    F/u annually or prn

## 2020-06-17 LAB
A-G RATIO,AGRAT: 2.7 RATIO (ref 1.1–2.6)
ABSOLUTE LYMPHOCYTE COUNT, 10803: 1.5 K/UL (ref 1–4.8)
ALBUMIN SERPL-MCNC: 5.1 G/DL (ref 3.5–5)
ALP SERPL-CCNC: 67 U/L (ref 25–115)
ALT SERPL-CCNC: 18 U/L (ref 5–40)
ANION GAP SERPL CALC-SCNC: 15 MMOL/L
AST SERPL W P-5'-P-CCNC: 15 U/L (ref 10–37)
BASOPHILS # BLD: 0.1 K/UL (ref 0–0.2)
BASOPHILS NFR BLD: 1 % (ref 0–2)
BILIRUB SERPL-MCNC: 0.3 MG/DL (ref 0.2–1.2)
BILIRUB UR QL: NEGATIVE
BUN SERPL-MCNC: 7 MG/DL (ref 6–22)
CALCIUM SERPL-MCNC: 9.5 MG/DL (ref 8.4–10.5)
CHLORIDE SERPL-SCNC: 100 MMOL/L (ref 98–110)
CHOLEST SERPL-MCNC: 194 MG/DL (ref 110–200)
CO2 SERPL-SCNC: 25 MMOL/L (ref 20–32)
CREAT SERPL-MCNC: 0.8 MG/DL (ref 0.5–1.2)
EOSINOPHIL # BLD: 0.2 K/UL (ref 0–0.5)
EOSINOPHIL NFR BLD: 3 % (ref 0–6)
EPITHELIAL,EPSU: NORMAL /HPF (ref 0–2)
ERYTHROCYTE [DISTWIDTH] IN BLOOD BY AUTOMATED COUNT: 14 % (ref 10–15.5)
GFRAA, 66117: >60
GFRNA, 66118: >60
GLOBULIN,GLOB: 1.9 G/DL (ref 2–4)
GLUCOSE SERPL-MCNC: 79 MG/DL (ref 70–99)
GLUCOSE UR QL: NEGATIVE MG/DL
GRANULOCYTES,GRANS: 56 % (ref 40–75)
HCT VFR BLD AUTO: 48.1 % (ref 35.1–46.5)
HDLC SERPL-MCNC: 3.2 MG/DL (ref 0–5)
HDLC SERPL-MCNC: 61 MG/DL
HGB BLD-MCNC: 14.3 G/DL (ref 11.7–15.5)
HGB UR QL STRIP: NEGATIVE
KETONES UR QL STRIP.AUTO: NEGATIVE MG/DL
LDL/HDL RATIO,LDHD: 1.9
LDLC SERPL CALC-MCNC: 118 MG/DL (ref 50–99)
LEUKOCYTE ESTERASE: NEGATIVE
LYMPHOCYTES, LYMLT: 28 % (ref 20–45)
MCH RBC QN AUTO: 28 PG (ref 26–34)
MCHC RBC AUTO-ENTMCNC: 30 G/DL (ref 31–36)
MCV RBC AUTO: 93 FL (ref 81–99)
MONOCYTES # BLD: 0.6 K/UL (ref 0.1–1)
MONOCYTES NFR BLD: 11 % (ref 3–12)
NEUTROPHILS # BLD AUTO: 3 K/UL (ref 1.8–7.7)
NITRITE UR QL STRIP.AUTO: NEGATIVE
NON-HDL CHOLESTEROL, 011976: 133 MG/DL
PH UR STRIP: 7 PH (ref 5–8)
PLATELET # BLD AUTO: 289 K/UL (ref 140–440)
PMV BLD AUTO: 11.4 FL (ref 9–13)
POTASSIUM SERPL-SCNC: 4.5 MMOL/L (ref 3.5–5.5)
PROT SERPL-MCNC: 7 G/DL (ref 6.4–8.3)
PROT UR QL STRIP: NEGATIVE MG/DL
RBC # BLD AUTO: 5.2 M/UL (ref 3.8–5.2)
RBC #/AREA URNS HPF: NORMAL /HPF
SODIUM SERPL-SCNC: 140 MMOL/L (ref 133–145)
SP GR UR: 1.01 (ref 1–1.03)
T4 FREE SERPL-MCNC: 1.2 NG/DL (ref 0.9–1.8)
TRIGL SERPL-MCNC: 75 MG/DL (ref 40–149)
TSH SERPL DL<=0.005 MIU/L-ACNC: 1.69 MCU/ML (ref 0.27–4.2)
UROBILINOGEN UR STRIP-MCNC: <2 MG/DL
VLDLC SERPL CALC-MCNC: 15 MG/DL (ref 8–30)
WBC # BLD AUTO: 5.4 K/UL (ref 4–11)
WBC URNS QL MICRO: NORMAL /HPF (ref 0–2)

## 2020-08-27 DIAGNOSIS — Z76.0 MEDICATION REFILL: ICD-10-CM

## 2020-08-27 RX ORDER — ALPRAZOLAM 0.25 MG/1
0.25 TABLET ORAL
Qty: 30 TAB | Refills: 5 | Status: SHIPPED | OUTPATIENT
Start: 2020-08-27 | End: 2021-02-25 | Stop reason: SDUPTHER

## 2020-10-06 ENCOUNTER — PATIENT MESSAGE (OUTPATIENT)
Dept: INTERNAL MEDICINE CLINIC | Age: 40
End: 2020-10-06

## 2020-10-06 DIAGNOSIS — Z76.0 MEDICATION REFILL: ICD-10-CM

## 2020-10-06 DIAGNOSIS — F41.9 ANXIETY: ICD-10-CM

## 2020-10-06 DIAGNOSIS — F32.A MILD DEPRESSION: ICD-10-CM

## 2020-10-06 RX ORDER — BUPROPION HYDROCHLORIDE 150 MG/1
300 TABLET ORAL
Qty: 180 TAB | Refills: 5 | Status: SHIPPED | OUTPATIENT
Start: 2020-10-06 | End: 2021-12-23

## 2020-10-06 RX ORDER — FLUOXETINE HYDROCHLORIDE 40 MG/1
40 CAPSULE ORAL DAILY
Qty: 90 CAP | Refills: 5 | Status: SHIPPED | OUTPATIENT
Start: 2020-10-06 | End: 2021-12-23

## 2020-10-07 RX ORDER — CYCLOBENZAPRINE HCL 10 MG
TABLET ORAL
Qty: 90 TAB | Refills: 5 | Status: SHIPPED | OUTPATIENT
Start: 2020-10-07 | End: 2021-12-23

## 2020-10-07 NOTE — TELEPHONE ENCOUNTER
From: Prem Ken  To: Royal Maki MD  Sent: 10/6/2020 1:35 PM EDT  Subject: Prescription Question    Good afternoon,  I hope you are doing well and staying safe. May I please have the refill for the following medications sent to   [address], [address] [place] [address] Phone number:[phone]  Rx[facility] [id number]   2 Yasmine Carrillo [id number]  ID [id number]   Name 01 Wendy Gildardokeshawns    FLUoxetine 40 mg capsule/PROzac  Take 1 cap by mouth daily. buPROPion  mg tablet/WELLBUTRIN XL  Take 2 tabs by mouth every morning. indications: anxiousness associated with depression    cyclobenzaprine 10 mg tablet/ FLEXERIL    I am employed with the [employer] and the prescription acct changed. Let me know if you need anything else. Thank you for your help.   Stay safe,  Prem Ken

## 2021-02-25 DIAGNOSIS — Z76.0 MEDICATION REFILL: ICD-10-CM

## 2021-03-02 RX ORDER — ALPRAZOLAM 0.25 MG/1
0.25 TABLET ORAL
Qty: 30 TAB | Refills: 5 | Status: SHIPPED | OUTPATIENT
Start: 2021-03-02 | End: 2021-09-23 | Stop reason: SDUPTHER

## 2021-09-17 DIAGNOSIS — Z76.0 MEDICATION REFILL: ICD-10-CM

## 2021-09-19 RX ORDER — ALPRAZOLAM 0.25 MG/1
TABLET ORAL
Qty: 30 TABLET | OUTPATIENT
Start: 2021-09-19

## 2021-09-21 NOTE — TELEPHONE ENCOUNTER
Noted. Called pt and left message. Call back number left and I myself or one of the other nurses will attempt to contact again. The call was to inform pt a follow up appt is needed.

## 2021-09-22 NOTE — TELEPHONE ENCOUNTER
Spoke with patient and she is almost out of medication, so I scheduled with Dr. Rufus Randolph tomorrow.

## 2021-09-23 ENCOUNTER — OFFICE VISIT (OUTPATIENT)
Dept: INTERNAL MEDICINE CLINIC | Age: 41
End: 2021-09-23
Payer: COMMERCIAL

## 2021-09-23 VITALS
OXYGEN SATURATION: 97 % | HEIGHT: 63 IN | SYSTOLIC BLOOD PRESSURE: 116 MMHG | RESPIRATION RATE: 18 BRPM | HEART RATE: 86 BPM | BODY MASS INDEX: 21.51 KG/M2 | DIASTOLIC BLOOD PRESSURE: 79 MMHG | WEIGHT: 121.4 LBS | TEMPERATURE: 97.3 F

## 2021-09-23 DIAGNOSIS — F41.9 ANXIETY: Primary | ICD-10-CM

## 2021-09-23 DIAGNOSIS — Z76.0 MEDICATION REFILL: ICD-10-CM

## 2021-09-23 PROCEDURE — 99213 OFFICE O/P EST LOW 20 MIN: CPT | Performed by: INTERNAL MEDICINE

## 2021-09-23 RX ORDER — ALPRAZOLAM 0.25 MG/1
0.25 TABLET ORAL
Qty: 30 TABLET | Refills: 5 | Status: SHIPPED | OUTPATIENT
Start: 2021-09-23 | End: 2022-03-29
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
## 2021-09-23 NOTE — PROGRESS NOTES
León Hayes is a 38 y.o. female (: 1980) presenting to address:    Chief Complaint   Patient presents with    Medication Refill    Anxiety       Vitals:    21 1031   BP: 116/79   Pulse: 86   Resp: 18   Temp: 97.3 °F (36.3 °C)   TempSrc: Temporal   SpO2: 97%   Weight: 121 lb 6.4 oz (55.1 kg)   Height: 5' 3\" (1.6 m)   PainSc:   0 - No pain       Hearing/Vision:   No exam data present    Learning Assessment:     Learning Assessment 2017   PRIMARY LEARNER Patient   HIGHEST LEVEL OF EDUCATION - PRIMARY LEARNER  SOME COLLEGE   PRIMARY LANGUAGE ENGLISH   LEARNER PREFERENCE PRIMARY LISTENING   ANSWERED BY Andrae Liriano   RELATIONSHIP SELF     Depression Screening:     3 most recent PHQ Screens 2017   PHQ Not Done Active Diagnosis of Depression or Bipolar Disorder   Little interest or pleasure in doing things Not at all   Feeling down, depressed, irritable, or hopeless Several days   Total Score PHQ 2 1     Fall Risk Assessment:   No flowsheet data found. Abuse Screening:     Abuse Screening Questionnaire 2017   Do you ever feel afraid of your partner? N   Are you in a relationship with someone who physically or mentally threatens you? N   Is it safe for you to go home? Y     Coordination of Care Questionaire:   1. Have you been to the ER, urgent care clinic since your last visit? Hospitalized since your last visit? NO    2. Have you seen or consulted any other health care providers outside of the [address] [place] [address] since your last visit? Include any pap smears or colon screening. YES ENT    Advanced Directive:   1. Do you have an Advanced Directive? NO    2. Would you like information on Advanced Directives?  NO

## 2021-12-23 DIAGNOSIS — F41.9 ANXIETY: ICD-10-CM

## 2021-12-23 DIAGNOSIS — F32.A MILD DEPRESSION: ICD-10-CM

## 2021-12-23 DIAGNOSIS — Z76.0 MEDICATION REFILL: ICD-10-CM

## 2021-12-23 RX ORDER — CYCLOBENZAPRINE HCL 10 MG
TABLET ORAL
Qty: 90 TABLET | Refills: 11 | Status: SHIPPED | OUTPATIENT
Start: 2021-12-23

## 2021-12-23 RX ORDER — FLUOXETINE HYDROCHLORIDE 40 MG/1
CAPSULE ORAL
Qty: 90 CAPSULE | Refills: 3 | Status: SHIPPED | OUTPATIENT
Start: 2021-12-23

## 2021-12-23 RX ORDER — BUPROPION HYDROCHLORIDE 150 MG/1
TABLET ORAL
Qty: 180 TABLET | Refills: 3 | Status: SHIPPED | OUTPATIENT
Start: 2021-12-23

## 2022-03-18 PROBLEM — F41.9 ANXIETY: Status: ACTIVE | Noted: 2017-02-16

## 2022-03-18 PROBLEM — F32.A MILD DEPRESSION: Status: ACTIVE | Noted: 2018-03-22

## 2022-03-18 PROBLEM — M26.629 TMJ SYNDROME: Status: ACTIVE | Noted: 2017-02-16

## 2022-03-19 PROBLEM — F32.A DEPRESSION: Status: ACTIVE | Noted: 2017-02-16

## 2022-03-19 PROBLEM — M62.838 MUSCLE SPASM: Status: ACTIVE | Noted: 2017-02-16

## 2022-03-19 PROBLEM — M54.2 NECK PAIN: Status: ACTIVE | Noted: 2017-02-16

## 2022-03-29 DIAGNOSIS — F41.9 ANXIETY: ICD-10-CM

## 2022-03-29 DIAGNOSIS — Z76.0 MEDICATION REFILL: ICD-10-CM

## 2022-03-29 RX ORDER — ALPRAZOLAM 0.25 MG/1
TABLET ORAL
Qty: 30 TABLET | Refills: 0 | Status: SHIPPED | OUTPATIENT
Start: 2022-03-29

## 2023-05-11 ENCOUNTER — OFFICE VISIT (OUTPATIENT)
Facility: CLINIC | Age: 43
End: 2023-05-11
Payer: COMMERCIAL

## 2023-05-11 VITALS
SYSTOLIC BLOOD PRESSURE: 124 MMHG | HEIGHT: 63 IN | WEIGHT: 120.8 LBS | BODY MASS INDEX: 21.4 KG/M2 | HEART RATE: 86 BPM | RESPIRATION RATE: 14 BRPM | OXYGEN SATURATION: 96 % | DIASTOLIC BLOOD PRESSURE: 83 MMHG | TEMPERATURE: 97.2 F

## 2023-05-11 DIAGNOSIS — F41.9 ANXIETY: ICD-10-CM

## 2023-05-11 DIAGNOSIS — G25.81 RLS (RESTLESS LEGS SYNDROME): ICD-10-CM

## 2023-05-11 DIAGNOSIS — F32.A DEPRESSION, UNSPECIFIED DEPRESSION TYPE: ICD-10-CM

## 2023-05-11 DIAGNOSIS — Z00.00 ROUTINE GENERAL MEDICAL EXAMINATION AT A HEALTH CARE FACILITY: Primary | ICD-10-CM

## 2023-05-11 DIAGNOSIS — Z76.0 MEDICATION REFILL: ICD-10-CM

## 2023-05-11 PROCEDURE — 99396 PREV VISIT EST AGE 40-64: CPT | Performed by: INTERNAL MEDICINE

## 2023-05-11 RX ORDER — ALPRAZOLAM 0.25 MG/1
0.25 TABLET ORAL NIGHTLY PRN
Qty: 30 TABLET | Refills: 5 | Status: SHIPPED | OUTPATIENT
Start: 2023-05-11 | End: 2023-06-10

## 2023-05-11 RX ORDER — CYCLOBENZAPRINE HCL 10 MG
10 TABLET ORAL 2 TIMES DAILY PRN
Qty: 60 TABLET | Refills: 5 | Status: SHIPPED | OUTPATIENT
Start: 2023-05-11 | End: 2023-06-10

## 2023-05-11 RX ORDER — BUPROPION HYDROCHLORIDE 150 MG/1
150 TABLET, EXTENDED RELEASE ORAL DAILY
Qty: 90 TABLET | Refills: 3 | Status: SHIPPED | OUTPATIENT
Start: 2023-05-11

## 2023-05-11 RX ORDER — FLUOXETINE HYDROCHLORIDE 40 MG/1
40 CAPSULE ORAL DAILY
Qty: 90 CAPSULE | Refills: 3 | Status: SHIPPED | OUTPATIENT
Start: 2023-05-11

## 2023-05-11 SDOH — ECONOMIC STABILITY: HOUSING INSECURITY
IN THE LAST 12 MONTHS, WAS THERE A TIME WHEN YOU DID NOT HAVE A STEADY PLACE TO SLEEP OR SLEPT IN A SHELTER (INCLUDING NOW)?: NO

## 2023-05-11 SDOH — ECONOMIC STABILITY: INCOME INSECURITY: HOW HARD IS IT FOR YOU TO PAY FOR THE VERY BASICS LIKE FOOD, HOUSING, MEDICAL CARE, AND HEATING?: NOT HARD AT ALL

## 2023-05-11 SDOH — ECONOMIC STABILITY: FOOD INSECURITY: WITHIN THE PAST 12 MONTHS, THE FOOD YOU BOUGHT JUST DIDN'T LAST AND YOU DIDN'T HAVE MONEY TO GET MORE.: NEVER TRUE

## 2023-05-11 SDOH — ECONOMIC STABILITY: FOOD INSECURITY: WITHIN THE PAST 12 MONTHS, YOU WORRIED THAT YOUR FOOD WOULD RUN OUT BEFORE YOU GOT MONEY TO BUY MORE.: NEVER TRUE

## 2023-05-11 ASSESSMENT — PATIENT HEALTH QUESTIONNAIRE - PHQ9
2. FEELING DOWN, DEPRESSED OR HOPELESS: 2
4. FEELING TIRED OR HAVING LITTLE ENERGY: 3
SUM OF ALL RESPONSES TO PHQ9 QUESTIONS 1 & 2: 4
6. FEELING BAD ABOUT YOURSELF - OR THAT YOU ARE A FAILURE OR HAVE LET YOURSELF OR YOUR FAMILY DOWN: 1
7. TROUBLE CONCENTRATING ON THINGS, SUCH AS READING THE NEWSPAPER OR WATCHING TELEVISION: 3
3. TROUBLE FALLING OR STAYING ASLEEP: 3
1. LITTLE INTEREST OR PLEASURE IN DOING THINGS: 2
10. IF YOU CHECKED OFF ANY PROBLEMS, HOW DIFFICULT HAVE THESE PROBLEMS MADE IT FOR YOU TO DO YOUR WORK, TAKE CARE OF THINGS AT HOME, OR GET ALONG WITH OTHER PEOPLE: 3
SUM OF ALL RESPONSES TO PHQ QUESTIONS 1-9: 14
SUM OF ALL RESPONSES TO PHQ QUESTIONS 1-9: 14
8. MOVING OR SPEAKING SO SLOWLY THAT OTHER PEOPLE COULD HAVE NOTICED. OR THE OPPOSITE, BEING SO FIGETY OR RESTLESS THAT YOU HAVE BEEN MOVING AROUND A LOT MORE THAN USUAL: 0
9. THOUGHTS THAT YOU WOULD BE BETTER OFF DEAD, OR OF HURTING YOURSELF: 0
5. POOR APPETITE OR OVEREATING: 0
SUM OF ALL RESPONSES TO PHQ QUESTIONS 1-9: 14
SUM OF ALL RESPONSES TO PHQ QUESTIONS 1-9: 14

## 2023-05-11 NOTE — PROGRESS NOTES
Amanda Clements is a 36 y.o.  female presents today for office visit for   Chief Complaint   Patient presents with    Annual Exam   .  1. \"Have you been to the ER, urgent care clinic since your last visit? Hospitalized since your last visit? \" No    2. \"Have you seen or consulted any other health care providers outside of the [address] [place] [address] since your last visit? \" Yes [facility], LCSW 2x a month. 3. For patients aged 39-70: Has the patient had a colonoscopy / FIT/ Cologuard? N/A     If the patient is female:    4. For patients aged 41-77: Has the patient had a mammogram within the past 2 years? No    5. For patients aged 21-65: Has the patient had a pap smear?  No

## 2023-05-11 NOTE — PROGRESS NOTES
HISTORY OF PRESENT ILLNESS      Michael Rodrigez is a 36 y.o. female. /83   Pulse 86   Temp 97.2 °F (36.2 °C) (Temporal)   Resp 14   Ht 5' 3\" (1.6 m)   Wt 120 lb 12.8 oz (54.8 kg)   LMP  (LMP Unknown)   SpO2 96%   BMI 21.40 kg/m²         Here for annual CPE      Review of Systems   Constitutional: Negative. Psychiatric/Behavioral:          Some stress issues. Having some trouble sleeping           Physical Exam  Vitals and nursing note reviewed. Constitutional:       Appearance: Normal appearance. HENT:      Head: Normocephalic and atraumatic. Right Ear: Tympanic membrane, ear canal and external ear normal.      Left Ear: Tympanic membrane, ear canal and external ear normal.      Mouth/Throat:      Mouth: Mucous membranes are moist.      Pharynx: Oropharynx is clear. Eyes:      Extraocular Movements: Extraocular movements intact. Conjunctiva/sclera: Conjunctivae normal.      Pupils: Pupils are equal, round, and reactive to light. Cardiovascular:      Rate and Rhythm: Normal rate and regular rhythm. Pulmonary:      Effort: Pulmonary effort is normal.      Breath sounds: Normal breath sounds. Chest:   Breasts:     Right: Normal.      Left: Normal.   Abdominal:      General: Abdomen is flat. There is no distension. Tenderness: There is no abdominal tenderness. Musculoskeletal:      Cervical back: Normal range of motion. Right lower leg: No edema. Left lower leg: No edema. Skin:     Coloration: Skin is not jaundiced or pale. Neurological:      General: No focal deficit present. Mental Status: She is alert and oriented to person, place, and time. Psychiatric:         Mood and Affect: Mood normal.         Behavior: Behavior normal.       ASSESSMENT and PLAN      ICD-10-CM    1. Routine general medical examination at a health care facility  Z00.00       2. Anxiety  F41.9 ALPRAZolam (XANAX) 0.25 MG tablet      3.  RLS (restless legs syndrome)  G25.81

## 2023-06-28 ENCOUNTER — OFFICE VISIT (OUTPATIENT)
Facility: CLINIC | Age: 43
End: 2023-06-28
Payer: COMMERCIAL

## 2023-06-28 VITALS
OXYGEN SATURATION: 97 % | SYSTOLIC BLOOD PRESSURE: 107 MMHG | WEIGHT: 121 LBS | DIASTOLIC BLOOD PRESSURE: 78 MMHG | HEART RATE: 98 BPM | RESPIRATION RATE: 16 BRPM | BODY MASS INDEX: 21.44 KG/M2 | HEIGHT: 63 IN

## 2023-06-28 DIAGNOSIS — F32.A DEPRESSION, UNSPECIFIED DEPRESSION TYPE: Primary | ICD-10-CM

## 2023-06-28 DIAGNOSIS — F98.8 ATTENTION DEFICIT DISORDER, UNSPECIFIED HYPERACTIVITY PRESENCE: ICD-10-CM

## 2023-06-28 DIAGNOSIS — F41.9 ANXIETY DISORDER, UNSPECIFIED TYPE: ICD-10-CM

## 2023-06-28 PROCEDURE — 99213 OFFICE O/P EST LOW 20 MIN: CPT | Performed by: INTERNAL MEDICINE

## 2024-02-22 ENCOUNTER — OFFICE VISIT (OUTPATIENT)
Facility: CLINIC | Age: 44
End: 2024-02-22
Payer: COMMERCIAL

## 2024-02-22 VITALS
HEIGHT: 63 IN | BODY MASS INDEX: 23.04 KG/M2 | SYSTOLIC BLOOD PRESSURE: 118 MMHG | DIASTOLIC BLOOD PRESSURE: 73 MMHG | TEMPERATURE: 97.1 F | WEIGHT: 130 LBS | HEART RATE: 90 BPM | RESPIRATION RATE: 16 BRPM | OXYGEN SATURATION: 98 %

## 2024-02-22 DIAGNOSIS — G25.81 RLS (RESTLESS LEGS SYNDROME): ICD-10-CM

## 2024-02-22 DIAGNOSIS — Z76.0 MEDICATION REFILL: ICD-10-CM

## 2024-02-22 DIAGNOSIS — F32.A DEPRESSION, UNSPECIFIED DEPRESSION TYPE: ICD-10-CM

## 2024-02-22 DIAGNOSIS — R20.2 PARESTHESIA OF LEFT FOOT: Primary | ICD-10-CM

## 2024-02-22 DIAGNOSIS — F41.9 ANXIETY: ICD-10-CM

## 2024-02-22 PROCEDURE — 99213 OFFICE O/P EST LOW 20 MIN: CPT | Performed by: INTERNAL MEDICINE

## 2024-02-22 RX ORDER — ALPRAZOLAM 0.25 MG/1
0.25 TABLET ORAL NIGHTLY PRN
Qty: 30 TABLET | Refills: 5 | Status: SHIPPED | OUTPATIENT
Start: 2024-02-22 | End: 2024-08-20

## 2024-02-22 RX ORDER — CYCLOBENZAPRINE HCL 10 MG
10 TABLET ORAL 2 TIMES DAILY PRN
Qty: 60 TABLET | Refills: 5 | Status: SHIPPED | OUTPATIENT
Start: 2024-02-22 | End: 2024-03-23

## 2024-02-22 ASSESSMENT — PATIENT HEALTH QUESTIONNAIRE - PHQ9
SUM OF ALL RESPONSES TO PHQ QUESTIONS 1-9: 14
4. FEELING TIRED OR HAVING LITTLE ENERGY: 3
1. LITTLE INTEREST OR PLEASURE IN DOING THINGS: 1
8. MOVING OR SPEAKING SO SLOWLY THAT OTHER PEOPLE COULD HAVE NOTICED. OR THE OPPOSITE, BEING SO FIGETY OR RESTLESS THAT YOU HAVE BEEN MOVING AROUND A LOT MORE THAN USUAL: 0
SUM OF ALL RESPONSES TO PHQ QUESTIONS 1-9: 14
2. FEELING DOWN, DEPRESSED OR HOPELESS: 3
6. FEELING BAD ABOUT YOURSELF - OR THAT YOU ARE A FAILURE OR HAVE LET YOURSELF OR YOUR FAMILY DOWN: 0
9. THOUGHTS THAT YOU WOULD BE BETTER OFF DEAD, OR OF HURTING YOURSELF: 0
SUM OF ALL RESPONSES TO PHQ QUESTIONS 1-9: 14
3. TROUBLE FALLING OR STAYING ASLEEP: 3
SUM OF ALL RESPONSES TO PHQ QUESTIONS 1-9: 14
10. IF YOU CHECKED OFF ANY PROBLEMS, HOW DIFFICULT HAVE THESE PROBLEMS MADE IT FOR YOU TO DO YOUR WORK, TAKE CARE OF THINGS AT HOME, OR GET ALONG WITH OTHER PEOPLE: 2
SUM OF ALL RESPONSES TO PHQ9 QUESTIONS 1 & 2: 4
7. TROUBLE CONCENTRATING ON THINGS, SUCH AS READING THE NEWSPAPER OR WATCHING TELEVISION: 3
5. POOR APPETITE OR OVEREATING: 1
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
## 2024-02-22 NOTE — PROGRESS NOTES
\"Have you been to the ER, urgent care clinic since your last visit?  Hospitalized since your last visit?\"    NO    “Have you seen or consulted any other health care providers outside of [facility] since your last visit?”    YES - When: approximately 2  weeks ago.  Where and Why: Dr. Hernandez.        “Have you had a pap smear?”    NO

## 2024-05-09 DIAGNOSIS — Z76.0 MEDICATION REFILL: ICD-10-CM

## 2024-05-09 DIAGNOSIS — F32.A DEPRESSION, UNSPECIFIED DEPRESSION TYPE: ICD-10-CM

## 2024-05-09 RX ORDER — FLUOXETINE HYDROCHLORIDE 40 MG/1
40 CAPSULE ORAL DAILY
Qty: 90 CAPSULE | Refills: 3 | Status: SHIPPED | OUTPATIENT
Start: 2024-05-09

## 2024-05-09 NOTE — TELEPHONE ENCOUNTER
Appointment reminder sent via [employer].    Last Appointment:  2/22/2024  No future appointments.

## 2024-06-05 ENCOUNTER — TELEPHONE (OUTPATIENT)
Facility: CLINIC | Age: 44
End: 2024-06-05

## 2024-06-05 DIAGNOSIS — E78.5 DYSLIPIDEMIA: ICD-10-CM

## 2024-06-05 DIAGNOSIS — F32.A DEPRESSION, UNSPECIFIED DEPRESSION TYPE: ICD-10-CM

## 2024-06-05 DIAGNOSIS — R53.83 OTHER FATIGUE: Primary | ICD-10-CM

## 2024-06-05 NOTE — TELEPHONE ENCOUNTER
Patient is requesting to come in prior to her 6/18 appt to do labs.  Please enter the orders when you have a moment.     Thank you

## 2024-06-07 NOTE — TELEPHONE ENCOUNTER
Orders Placed This Encounter    CBC with Auto Differential     Standing Status:   Future     Standing Expiration Date:   6/7/2025    Comprehensive Metabolic Panel     Standing Status:   Future     Standing Expiration Date:   6/7/2025    TSH + Free T4 Panel     Standing Status:   Future     Standing Expiration Date:   6/7/2025    Lipid Panel     Standing Status:   Future     Standing Expiration Date:   6/7/2025     Encounter Diagnoses   Name Primary?    Other fatigue Yes    Depression, unspecified depression type     Dyslipidemia

## 2024-06-07 NOTE — TELEPHONE ENCOUNTER
Hi - I received vm from nurse - thank you, tried to call back but office is closed. I would like to have blood work to check thyroid levels. My therapist and coworker both said symptoms sound like hypothyroidism. Extreme fatigue, constipation, weakness, puffy, and weight gain without change in diet or exercise.  These symptoms could be from other things but ruling out thyroid is reason for blood work request.  Thank you for your time and help!

## 2024-06-15 LAB
ALBUMIN SERPL-MCNC: 4.5 G/DL (ref 3.9–4.9)
ALP SERPL-CCNC: 68 IU/L (ref 44–121)
ALT SERPL-CCNC: 18 IU/L (ref 0–32)
AST SERPL-CCNC: 18 IU/L (ref 0–40)
BASOPHILS # BLD AUTO: 0.1 X10E3/UL (ref 0–0.2)
BASOPHILS NFR BLD AUTO: 1 %
BILIRUB SERPL-MCNC: <0.2 MG/DL (ref 0–1.2)
BUN SERPL-MCNC: 9 MG/DL (ref 6–24)
BUN/CREAT SERPL: 13 (ref 9–23)
CALCIUM SERPL-MCNC: 9.1 MG/DL (ref 8.7–10.2)
CHLORIDE SERPL-SCNC: 103 MMOL/L (ref 96–106)
CHOLEST SERPL-MCNC: 222 MG/DL (ref 100–199)
CO2 SERPL-SCNC: 22 MMOL/L (ref 20–29)
CREAT SERPL-MCNC: 0.72 MG/DL (ref 0.57–1)
EGFRCR SERPLBLD CKD-EPI 2021: 106 ML/MIN/1.73
EOSINOPHIL # BLD AUTO: 0.1 X10E3/UL (ref 0–0.4)
EOSINOPHIL NFR BLD AUTO: 2 %
ERYTHROCYTE [DISTWIDTH] IN BLOOD BY AUTOMATED COUNT: 13 % (ref 11.7–15.4)
GLOBULIN SER CALC-MCNC: 2.3 G/DL (ref 1.5–4.5)
GLUCOSE SERPL-MCNC: 88 MG/DL (ref 70–99)
HCT VFR BLD AUTO: 44 % (ref 34–46.6)
HDLC SERPL-MCNC: 60 MG/DL
HGB BLD-MCNC: 13.9 G/DL (ref 11.1–15.9)
IMM GRANULOCYTES # BLD AUTO: 0 X10E3/UL (ref 0–0.1)
IMM GRANULOCYTES NFR BLD AUTO: 0 %
LDLC SERPL CALC-MCNC: 149 MG/DL (ref 0–99)
LYMPHOCYTES # BLD AUTO: 1.7 X10E3/UL (ref 0.7–3.1)
LYMPHOCYTES NFR BLD AUTO: 22 %
MCH RBC QN AUTO: 27.6 PG (ref 26.6–33)
MCHC RBC AUTO-ENTMCNC: 31.6 G/DL (ref 31.5–35.7)
MCV RBC AUTO: 88 FL (ref 79–97)
MONOCYTES # BLD AUTO: 0.6 X10E3/UL (ref 0.1–0.9)
MONOCYTES NFR BLD AUTO: 8 %
NEUTROPHILS # BLD AUTO: 5.1 X10E3/UL (ref 1.4–7)
NEUTROPHILS NFR BLD AUTO: 67 %
PLATELET # BLD AUTO: 260 X10E3/UL (ref 150–450)
POTASSIUM SERPL-SCNC: 4.2 MMOL/L (ref 3.5–5.2)
PROT SERPL-MCNC: 6.8 G/DL (ref 6–8.5)
RBC # BLD AUTO: 5.03 X10E6/UL (ref 3.77–5.28)
SODIUM SERPL-SCNC: 140 MMOL/L (ref 134–144)
T4 FREE SERPL-MCNC: 0.99 NG/DL (ref 0.82–1.77)
TRIGL SERPL-MCNC: 72 MG/DL (ref 0–149)
TSH SERPL DL<=0.005 MIU/L-ACNC: 1.11 UIU/ML (ref 0.45–4.5)
VLDLC SERPL CALC-MCNC: 13 MG/DL (ref 5–40)
WBC # BLD AUTO: 7.6 X10E3/UL (ref 3.4–10.8)

## 2024-06-18 ENCOUNTER — OFFICE VISIT (OUTPATIENT)
Facility: CLINIC | Age: 44
End: 2024-06-18
Payer: COMMERCIAL

## 2024-06-18 VITALS
HEART RATE: 86 BPM | RESPIRATION RATE: 14 BRPM | DIASTOLIC BLOOD PRESSURE: 77 MMHG | WEIGHT: 124 LBS | BODY MASS INDEX: 21.97 KG/M2 | HEIGHT: 63 IN | TEMPERATURE: 97.1 F | SYSTOLIC BLOOD PRESSURE: 119 MMHG | OXYGEN SATURATION: 97 %

## 2024-06-18 DIAGNOSIS — Z76.0 MEDICATION REFILL: ICD-10-CM

## 2024-06-18 DIAGNOSIS — G47.9 SLEEP DISORDER: ICD-10-CM

## 2024-06-18 DIAGNOSIS — F32.A DEPRESSION, UNSPECIFIED DEPRESSION TYPE: ICD-10-CM

## 2024-06-18 DIAGNOSIS — R14.0 ABDOMINAL BLOATING: Primary | ICD-10-CM

## 2024-06-18 DIAGNOSIS — F41.9 ANXIETY: ICD-10-CM

## 2024-06-18 PROCEDURE — 99214 OFFICE O/P EST MOD 30 MIN: CPT | Performed by: INTERNAL MEDICINE

## 2024-06-18 RX ORDER — BUPROPION HYDROCHLORIDE 150 MG/1
150 TABLET, FILM COATED, EXTENDED RELEASE ORAL DAILY
Qty: 90 TABLET | Refills: 3 | Status: SHIPPED | OUTPATIENT
Start: 2024-06-18

## 2024-06-18 RX ORDER — ALPRAZOLAM 0.25 MG/1
0.25 TABLET ORAL NIGHTLY PRN
Qty: 30 TABLET | Refills: 5 | Status: SHIPPED | OUTPATIENT
Start: 2024-06-18 | End: 2024-12-15

## 2024-06-18 ASSESSMENT — PATIENT HEALTH QUESTIONNAIRE - PHQ9
SUM OF ALL RESPONSES TO PHQ QUESTIONS 1-9: 9
4. FEELING TIRED OR HAVING LITTLE ENERGY: NEARLY EVERY DAY
3. TROUBLE FALLING OR STAYING ASLEEP: NEARLY EVERY DAY
SUM OF ALL RESPONSES TO PHQ9 QUESTIONS 1 & 2: 0
1. LITTLE INTEREST OR PLEASURE IN DOING THINGS: NOT AT ALL
9. THOUGHTS THAT YOU WOULD BE BETTER OFF DEAD, OR OF HURTING YOURSELF: NOT AT ALL
10. IF YOU CHECKED OFF ANY PROBLEMS, HOW DIFFICULT HAVE THESE PROBLEMS MADE IT FOR YOU TO DO YOUR WORK, TAKE CARE OF THINGS AT HOME, OR GET ALONG WITH OTHER PEOPLE: VERY DIFFICULT
5. POOR APPETITE OR OVEREATING: SEVERAL DAYS
SUM OF ALL RESPONSES TO PHQ QUESTIONS 1-9: 9
6. FEELING BAD ABOUT YOURSELF - OR THAT YOU ARE A FAILURE OR HAVE LET YOURSELF OR YOUR FAMILY DOWN: NOT AT ALL
SUM OF ALL RESPONSES TO PHQ QUESTIONS 1-9: 9
SUM OF ALL RESPONSES TO PHQ QUESTIONS 1-9: 9
2. FEELING DOWN, DEPRESSED OR HOPELESS: NOT AT ALL
7. TROUBLE CONCENTRATING ON THINGS, SUCH AS READING THE NEWSPAPER OR WATCHING TELEVISION: MORE THAN HALF THE DAYS
8. MOVING OR SPEAKING SO SLOWLY THAT OTHER PEOPLE COULD HAVE NOTICED. OR THE OPPOSITE, BEING SO FIGETY OR RESTLESS THAT YOU HAVE BEEN MOVING AROUND A LOT MORE THAN USUAL: NOT AT ALL

## 2024-06-18 ASSESSMENT — ENCOUNTER SYMPTOMS
ABDOMINAL PAIN: 0
NAUSEA: 0
CONSTIPATION: 0
ABDOMINAL DISTENTION: 1
VOMITING: 0
DIARRHEA: 0
BLOOD IN STOOL: 0

## 2024-06-18 NOTE — PROGRESS NOTES
\"Have you been to the ER, urgent care clinic since your last visit?  Hospitalized since your last visit?\"    NO    “Have you seen or consulted any other health care providers outside of [facility] since your last visit?”    NO    Have you had a mammogram?”   NO    No breast cancer screening on file      “Have you had a pap smear?”    NO    Date of last Cervical Cancer screen (HPV or PAP): 5/13/2014             Click Here for Release of Records Request  
person, place, and time.   Psychiatric:         Mood and Affect: Mood normal.         Behavior: Behavior normal.         ASSESSMENT and PLAN      ICD-10-CM    1. Abdominal bloating  R14.0 US ABDOMEN COMPLETE      2. Depression, unspecified depression type  F32.A buPROPion (ZYBAN) 150 MG extended release tablet     ALPRAZolam (XANAX) 0.25 MG tablet      3. Anxiety  F41.9 ALPRAZolam (XANAX) 0.25 MG tablet      4. Sleep disorder  G47.9 External Referral To Sleep Medicine      5. Medication refill  Z76.0 buPROPion (ZYBAN) 150 MG extended release tablet     ALPRAZolam (XANAX) 0.25 MG tablet             Bloating. Will check U/S.  Discussed possibilities such as IBS.  Also need to consider testing for celiac disease.  We can do that at her next visit when her lab work is due unless her symptoms worsen.  She Seemed to Indicate some breads to bother her.  I suggested she try to avoid gluten is much as possible to see if that helps her symptoms.  I also suggested that she could try a probiotic.    ? Sleep disorder.  She reports a lot of fatigue.  Her thyroid was normal.  She is a snorer.  She does have daytime somnolence.  While physically she does not fit the usual profile of someone with sleep apnea and this must be evaluated and ruled out.  Refer to [facility] sleep disorders clinic    She feels her depression symptoms and anxiety symptoms are reasonably well-controlled on current medications.    Med refills as noted    Lab was done earlier for this appt and reviewed tomyra. Other than cholesterol it looked fine. Her thyroid was normal.  At next visit we should add a celiac profile    F/u 3 months for recheck

## 2024-10-23 ENCOUNTER — OFFICE VISIT (OUTPATIENT)
Facility: CLINIC | Age: 44
End: 2024-10-23
Payer: COMMERCIAL

## 2024-10-23 VITALS
DIASTOLIC BLOOD PRESSURE: 83 MMHG | HEART RATE: 97 BPM | WEIGHT: 131.4 LBS | RESPIRATION RATE: 14 BRPM | OXYGEN SATURATION: 97 % | SYSTOLIC BLOOD PRESSURE: 126 MMHG | BODY MASS INDEX: 23.28 KG/M2 | HEIGHT: 63 IN | TEMPERATURE: 98.1 F

## 2024-10-23 DIAGNOSIS — R14.0 ABDOMINAL BLOATING: ICD-10-CM

## 2024-10-23 DIAGNOSIS — F41.9 ANXIETY: ICD-10-CM

## 2024-10-23 DIAGNOSIS — F32.A DEPRESSION, UNSPECIFIED DEPRESSION TYPE: ICD-10-CM

## 2024-10-23 DIAGNOSIS — R53.83 OTHER FATIGUE: ICD-10-CM

## 2024-10-23 DIAGNOSIS — K90.41 NON-CELIAC GLUTEN SENSITIVITY: Primary | ICD-10-CM

## 2024-10-23 PROCEDURE — 99213 OFFICE O/P EST LOW 20 MIN: CPT | Performed by: INTERNAL MEDICINE

## 2024-10-23 SDOH — ECONOMIC STABILITY: FOOD INSECURITY: WITHIN THE PAST 12 MONTHS, THE FOOD YOU BOUGHT JUST DIDN'T LAST AND YOU DIDN'T HAVE MONEY TO GET MORE.: NEVER TRUE

## 2024-10-23 SDOH — ECONOMIC STABILITY: INCOME INSECURITY: HOW HARD IS IT FOR YOU TO PAY FOR THE VERY BASICS LIKE FOOD, HOUSING, MEDICAL CARE, AND HEATING?: NOT VERY HARD

## 2024-10-23 SDOH — ECONOMIC STABILITY: FOOD INSECURITY: WITHIN THE PAST 12 MONTHS, YOU WORRIED THAT YOUR FOOD WOULD RUN OUT BEFORE YOU GOT MONEY TO BUY MORE.: NEVER TRUE

## 2024-10-23 ASSESSMENT — ENCOUNTER SYMPTOMS: GASTROINTESTINAL NEGATIVE: 1

## 2024-10-23 NOTE — PROGRESS NOTES
HISTORY OF PRESENT ILLNESS      Shu Garrett is a 43 y.o. female.    /83 (Site: Left Upper Arm, Position: Sitting, Cuff Size: Medium Adult)   Pulse 97   Temp 98.1 °F (36.7 °C) (Temporal)   Resp 14   Ht 1.6 m (5' 3\")   Wt 59.6 kg (131 lb 6.4 oz)   LMP 09/28/2024 (Exact Date)   SpO2 97%   BMI 23.28 kg/m²         Her abdominal sxs got much better when she started paying attention to gluten and cut a lot out of her diet. Within several days she felt much    She also got a progesterone cream as well and feels much better with her other sxs.    Mood also got better. Sleeping better.          Review of Systems   Constitutional: Negative.    Gastrointestinal: Negative.    Psychiatric/Behavioral: Negative.             Physical Exam  Vitals and nursing note reviewed.   Constitutional:       Appearance: Normal appearance.   Neurological:      Mental Status: She is alert.   Psychiatric:         Mood and Affect: Mood normal.         Behavior: Behavior normal.         ASSESSMENT and PLAN      ICD-10-CM    1. Non-celiac gluten sensitivity  K90.41       2. Abdominal bloating  R14.0 Celiac Disease Panel      3. Depression, unspecified depression type  F32.A       4. Anxiety  F41.9       5. Other fatigue  R53.83            All sxs are much better. Bloating resolved. Fatigue resolved    Stress and anxiety are stable    Perimenopausal sxs are also much better with the progesterone also    F/U 6 months for recheck (she will have lab done at her convenience to r/o actual celiac)

## 2024-10-23 NOTE — PROGRESS NOTES
\"Have you been to the ER, urgent care clinic since your last visit?  Hospitalized since your last visit?\"    NO    “Have you seen or consulted any other health care providers outside of [facility] since your last visit?”    NO    Have you had a mammogram?”   NO    No breast cancer screening on file      “Have you had a pap smear?”    NO    Date of last Cervical Cancer screen (HPV or PAP): 5/13/2014             Click Here for Release of Records Request

## 2025-02-11 ENCOUNTER — PATIENT MESSAGE (OUTPATIENT)
Facility: CLINIC | Age: 45
End: 2025-02-11

## 2025-02-11 DIAGNOSIS — F41.9 ANXIETY: ICD-10-CM

## 2025-02-11 DIAGNOSIS — Z76.0 MEDICATION REFILL: ICD-10-CM

## 2025-02-11 DIAGNOSIS — F32.A DEPRESSION, UNSPECIFIED DEPRESSION TYPE: ICD-10-CM

## 2025-02-11 RX ORDER — BUPROPION HYDROCHLORIDE 150 MG/1
150 TABLET, FILM COATED, EXTENDED RELEASE ORAL DAILY
Qty: 90 TABLET | Refills: 3 | Status: CANCELLED | OUTPATIENT
Start: 2025-02-11

## 2025-02-11 RX ORDER — FLUOXETINE HYDROCHLORIDE 40 MG/1
40 CAPSULE ORAL DAILY
Qty: 90 CAPSULE | Refills: 3 | Status: CANCELLED | OUTPATIENT
Start: 2025-02-11

## 2025-02-12 DIAGNOSIS — F41.9 ANXIETY: ICD-10-CM

## 2025-02-12 DIAGNOSIS — F32.A DEPRESSION, UNSPECIFIED DEPRESSION TYPE: ICD-10-CM

## 2025-02-12 DIAGNOSIS — Z76.0 MEDICATION REFILL: ICD-10-CM

## 2025-02-12 RX ORDER — BUPROPION HYDROCHLORIDE 150 MG/1
150 TABLET, FILM COATED, EXTENDED RELEASE ORAL DAILY
Qty: 90 TABLET | Refills: 3 | Status: SHIPPED | OUTPATIENT
Start: 2025-02-12

## 2025-02-12 RX ORDER — FLUOXETINE 40 MG/1
40 CAPSULE ORAL DAILY
Qty: 90 CAPSULE | Refills: 3 | Status: SHIPPED | OUTPATIENT
Start: 2025-02-12

## 2025-02-12 RX ORDER — ALPRAZOLAM 0.25 MG/1
0.25 TABLET ORAL NIGHTLY PRN
Qty: 30 TABLET | Refills: 5 | Status: SHIPPED | OUTPATIENT
Start: 2025-02-12 | End: 2025-02-12 | Stop reason: SDUPTHER

## 2025-02-12 RX ORDER — ALPRAZOLAM 0.25 MG/1
0.25 TABLET ORAL NIGHTLY PRN
Qty: 30 TABLET | Refills: 5 | Status: SHIPPED | OUTPATIENT
Start: 2025-02-12 | End: 2025-08-11

## 2025-02-12 RX ORDER — BUPROPION HYDROCHLORIDE 150 MG/1
150 TABLET, FILM COATED, EXTENDED RELEASE ORAL DAILY
Qty: 90 TABLET | Refills: 3 | Status: SHIPPED | OUTPATIENT
Start: 2025-02-12 | End: 2025-02-12 | Stop reason: SDUPTHER

## 2025-02-12 RX ORDER — FLUOXETINE 40 MG/1
40 CAPSULE ORAL DAILY
Qty: 90 CAPSULE | Refills: 3 | Status: SHIPPED | OUTPATIENT
Start: 2025-02-12 | End: 2025-02-12 | Stop reason: SDUPTHER

## 2025-02-12 NOTE — TELEPHONE ENCOUNTER
Ms. Garrett is requesting refills of:    Requested Prescriptions     Pending Prescriptions Disp Refills    buPROPion (ZYBAN) 150 MG extended release tablet 90 tablet 3     Sig: Take 1 tablet by mouth daily    FLUoxetine (PROZAC) 40 MG capsule 90 capsule 3     Sig: Take 1 capsule by mouth daily    ALPRAZolam (XANAX) 0.25 MG tablet 30 tablet 5     Sig: Take 1 tablet by mouth nightly as needed for Sleep for up to 180 days. Max Daily Amount: 0.25 mg         to be sent to     [id number] - [place], [place] - [address] [place] [address] -  [phone] - F [phone]  [address] [address]  Phone: [phone] Fax: [phone]  .     LAST OFFICE VISIT:  10/23/2024     UPCOMING APPOINTMENT(S):  No future appointments.    Patient offered an appointment? yes - via [facility]  How much medication does the patient have on hand? UNKNOWN    Provided notified

## 2025-02-12 NOTE — TELEPHONE ENCOUNTER
Orders Placed This Encounter    buPROPion (ZYBAN) 150 MG extended release tablet     Sig: Take 1 tablet by mouth daily     Dispense:  90 tablet     Refill:  3    FLUoxetine (PROZAC) 40 MG capsule     Sig: Take 1 capsule by mouth daily     Dispense:  90 capsule     Refill:  3    ALPRAZolam (XANAX) 0.25 MG tablet     Sig: Take 1 tablet by mouth nightly as needed for Sleep for up to 180 days. Max Daily Amount: 0.25 mg     Dispense:  30 tablet     Refill:  5

## 2025-02-13 DIAGNOSIS — F32.A DEPRESSION, UNSPECIFIED DEPRESSION TYPE: ICD-10-CM

## 2025-02-13 DIAGNOSIS — F41.9 ANXIETY: ICD-10-CM

## 2025-02-13 DIAGNOSIS — Z76.0 MEDICATION REFILL: ICD-10-CM

## 2025-06-19 ENCOUNTER — OFFICE VISIT (OUTPATIENT)
Facility: CLINIC | Age: 45
End: 2025-06-19
Payer: COMMERCIAL

## 2025-06-19 ENCOUNTER — HOSPITAL ENCOUNTER (OUTPATIENT)
Facility: HOSPITAL | Age: 45
Setting detail: SPECIMEN
Discharge: HOME | End: 2025-06-22
Payer: COMMERCIAL

## 2025-06-19 VITALS
WEIGHT: 127.8 LBS | HEIGHT: 64 IN | BODY MASS INDEX: 21.82 KG/M2 | DIASTOLIC BLOOD PRESSURE: 69 MMHG | OXYGEN SATURATION: 98 % | SYSTOLIC BLOOD PRESSURE: 109 MMHG | TEMPERATURE: 98.2 F | HEART RATE: 90 BPM | RESPIRATION RATE: 16 BRPM

## 2025-06-19 DIAGNOSIS — R14.0 ABDOMINAL BLOATING: ICD-10-CM

## 2025-06-19 DIAGNOSIS — F41.9 ANXIETY: Primary | ICD-10-CM

## 2025-06-19 DIAGNOSIS — E78.5 DYSLIPIDEMIA: ICD-10-CM

## 2025-06-19 DIAGNOSIS — Z76.0 MEDICATION REFILL: ICD-10-CM

## 2025-06-19 DIAGNOSIS — F32.A DEPRESSION, UNSPECIFIED DEPRESSION TYPE: ICD-10-CM

## 2025-06-19 LAB
CHOLEST SERPL-MCNC: 215 MG/DL
HDLC SERPL-MCNC: 54 MG/DL (ref 40–60)
HDLC SERPL: 4 (ref 0–5)
LDLC SERPL CALC-MCNC: 142 MG/DL (ref 0–100)
TRIGL SERPL-MCNC: 97 MG/DL (ref 0–150)
VLDLC SERPL CALC-MCNC: 19 MG/DL

## 2025-06-19 PROCEDURE — 36415 COLL VENOUS BLD VENIPUNCTURE: CPT

## 2025-06-19 PROCEDURE — 82784 ASSAY IGA/IGD/IGG/IGM EACH: CPT

## 2025-06-19 PROCEDURE — 99213 OFFICE O/P EST LOW 20 MIN: CPT | Performed by: INTERNAL MEDICINE

## 2025-06-19 PROCEDURE — 86231 EMA EACH IG CLASS: CPT

## 2025-06-19 PROCEDURE — 86364 TISS TRNSGLTMNASE EA IG CLAS: CPT

## 2025-06-19 PROCEDURE — 80061 LIPID PANEL: CPT

## 2025-06-19 RX ORDER — FLUOXETINE HYDROCHLORIDE 40 MG/1
40 CAPSULE ORAL DAILY
Qty: 90 CAPSULE | Refills: 3 | Status: SHIPPED | OUTPATIENT
Start: 2025-06-19

## 2025-06-19 RX ORDER — ALPRAZOLAM 0.25 MG
0.25 TABLET ORAL NIGHTLY PRN
Qty: 30 TABLET | Refills: 5 | Status: SHIPPED | OUTPATIENT
Start: 2025-06-19 | End: 2025-12-16

## 2025-06-19 RX ORDER — BUPROPION HYDROCHLORIDE 150 MG/1
150 TABLET, FILM COATED, EXTENDED RELEASE ORAL DAILY
Qty: 90 TABLET | Refills: 3 | Status: SHIPPED | OUTPATIENT
Start: 2025-06-19

## 2025-06-19 SDOH — ECONOMIC STABILITY: FOOD INSECURITY: WITHIN THE PAST 12 MONTHS, YOU WORRIED THAT YOUR FOOD WOULD RUN OUT BEFORE YOU GOT MONEY TO BUY MORE.: NEVER TRUE

## 2025-06-19 SDOH — ECONOMIC STABILITY: FOOD INSECURITY: WITHIN THE PAST 12 MONTHS, THE FOOD YOU BOUGHT JUST DIDN'T LAST AND YOU DIDN'T HAVE MONEY TO GET MORE.: NEVER TRUE

## 2025-06-19 ASSESSMENT — PATIENT HEALTH QUESTIONNAIRE - PHQ9
3. TROUBLE FALLING OR STAYING ASLEEP: NOT AT ALL
SUM OF ALL RESPONSES TO PHQ QUESTIONS 1-9: 0
4. FEELING TIRED OR HAVING LITTLE ENERGY: NOT AT ALL
1. LITTLE INTEREST OR PLEASURE IN DOING THINGS: NOT AT ALL
8. MOVING OR SPEAKING SO SLOWLY THAT OTHER PEOPLE COULD HAVE NOTICED. OR THE OPPOSITE, BEING SO FIGETY OR RESTLESS THAT YOU HAVE BEEN MOVING AROUND A LOT MORE THAN USUAL: NOT AT ALL
SUM OF ALL RESPONSES TO PHQ QUESTIONS 1-9: 0
2. FEELING DOWN, DEPRESSED OR HOPELESS: NOT AT ALL
5. POOR APPETITE OR OVEREATING: NOT AT ALL
SUM OF ALL RESPONSES TO PHQ QUESTIONS 1-9: 0
6. FEELING BAD ABOUT YOURSELF - OR THAT YOU ARE A FAILURE OR HAVE LET YOURSELF OR YOUR FAMILY DOWN: NOT AT ALL
9. THOUGHTS THAT YOU WOULD BE BETTER OFF DEAD, OR OF HURTING YOURSELF: NOT AT ALL
SUM OF ALL RESPONSES TO PHQ QUESTIONS 1-9: 0
7. TROUBLE CONCENTRATING ON THINGS, SUCH AS READING THE NEWSPAPER OR WATCHING TELEVISION: NOT AT ALL
10. IF YOU CHECKED OFF ANY PROBLEMS, HOW DIFFICULT HAVE THESE PROBLEMS MADE IT FOR YOU TO DO YOUR WORK, TAKE CARE OF THINGS AT HOME, OR GET ALONG WITH OTHER PEOPLE: NOT DIFFICULT AT ALL

## 2025-06-19 ASSESSMENT — ENCOUNTER SYMPTOMS: SHORTNESS OF BREATH: 0

## 2025-06-19 NOTE — PROGRESS NOTES
Have you been to the ER, urgent care clinic since your last visit?  Hospitalized since your last visit?   NO    Have you seen or consulted any other health care providers outside our system since your last visit?   NO    Have you had a mammogram?”   NO    No breast cancer screening on file      “Have you had a pap smear?”    NO    Date of last Cervical Cancer screen (HPV or PAP): 5/13/2014       “Have you had a colorectal cancer screening such as a colonoscopy/FIT/Cologuard?    NO    No colonoscopy on file  No cologuard on file  No FIT/FOBT on file   No flexible sigmoidoscopy on file

## 2025-06-19 NOTE — PROGRESS NOTES
HISTORY OF PRESENT ILLNESS      Shu Garrett is a 44 y.o. female.    /69 (BP Site: Right Upper Arm, Patient Position: Sitting, BP Cuff Size: Medium Adult)   Pulse 90   Temp 98.2 °F (36.8 °C) (Temporal)   Resp 16   Ht 1.626 m (5' 4\")   Wt 58 kg (127 lb 12.8 oz)   LMP 06/13/2025   SpO2 98%   BMI 21.94 kg/m²     The patient returns today for follow-up of several issues    H/o perimenopause sxs    H/o abdominal bloating    Was to have lab screen for celiac but did not return for this.  Her overall sxs have improved significantly with cutting out gluten and sugar.    H/o anxiety and depression  These are dong well on current meds and doses                Review of Systems   Constitutional: Negative.    Respiratory:  Negative for shortness of breath.    Cardiovascular:  Negative for chest pain and palpitations.   Psychiatric/Behavioral:  Negative for behavioral problems and dysphoric mood. The patient is not nervous/anxious (Controlled).            Physical Exam  Vitals and nursing note reviewed.   Constitutional:       Appearance: Normal appearance.   HENT:      Head: Normocephalic and atraumatic.   Cardiovascular:      Rate and Rhythm: Normal rate and regular rhythm.   Pulmonary:      Effort: Pulmonary effort is normal.      Breath sounds: Normal breath sounds.   Skin:     General: Skin is warm and dry.   Neurological:      General: No focal deficit present.      Mental Status: She is alert and oriented to person, place, and time.   Psychiatric:         Mood and Affect: Mood normal.         Behavior: Behavior normal.         ASSESSMENT and PLAN      ICD-10-CM    1. Anxiety  F41.9 ALPRAZolam (XANAX) 0.25 MG tablet      2. Depression, unspecified depression type  F32.A ALPRAZolam (XANAX) 0.25 MG tablet     buPROPion (ZYBAN) 150 MG extended release tablet     FLUoxetine (PROZAC) 40 MG capsule      3. Dyslipidemia  E78.5 Lipid Panel      4. Medication refill  Z76.0 ALPRAZolam (XANAX) 0.25 MG tablet

## 2025-06-20 ENCOUNTER — RESULTS FOLLOW-UP (OUTPATIENT)
Facility: CLINIC | Age: 45
End: 2025-06-20

## 2025-06-22 LAB
ENDOMYSIUM IGA SER QL: NORMAL
IGA SERPL-MCNC: 116 MG/DL (ref 87–352)
TTG IGA SER-ACNC: <2 U/ML (ref 0–3)

## 2025-06-24 ENCOUNTER — RESULTS FOLLOW-UP (OUTPATIENT)
Facility: CLINIC | Age: 45
End: 2025-06-24

## 2025-06-24 LAB
ENDOMYSIUM IGA SER QL: NEGATIVE
IGA SERPL-MCNC: 116 MG/DL (ref 87–352)
TTG IGA SER-ACNC: <2 U/ML (ref 0–3)